# Patient Record
Sex: MALE | Race: WHITE | NOT HISPANIC OR LATINO | Employment: FULL TIME | URBAN - METROPOLITAN AREA
[De-identification: names, ages, dates, MRNs, and addresses within clinical notes are randomized per-mention and may not be internally consistent; named-entity substitution may affect disease eponyms.]

---

## 2017-01-05 RX ORDER — DICLOFENAC POTASSIUM 50 MG/1
TABLET, FILM COATED ORAL
Qty: 60 TABLET | Refills: 0 | Status: SHIPPED | OUTPATIENT
Start: 2017-01-05 | End: 2017-01-09

## 2017-01-09 ENCOUNTER — OFFICE VISIT (OUTPATIENT)
Dept: FAMILY MEDICINE CLINIC | Facility: CLINIC | Age: 42
End: 2017-01-09

## 2017-01-09 VITALS
WEIGHT: 242 LBS | HEIGHT: 73 IN | RESPIRATION RATE: 15 BRPM | TEMPERATURE: 98.3 F | HEART RATE: 83 BPM | SYSTOLIC BLOOD PRESSURE: 104 MMHG | BODY MASS INDEX: 32.07 KG/M2 | DIASTOLIC BLOOD PRESSURE: 70 MMHG

## 2017-01-09 DIAGNOSIS — R20.0 NUMBNESS IN FEET: ICD-10-CM

## 2017-01-09 DIAGNOSIS — G60.9 IDIOPATHIC PERIPHERAL NEUROPATHY: ICD-10-CM

## 2017-01-09 DIAGNOSIS — E78.5 HYPERLIPIDEMIA, UNSPECIFIED HYPERLIPIDEMIA TYPE: ICD-10-CM

## 2017-01-09 DIAGNOSIS — I10 ESSENTIAL HYPERTENSION: Primary | ICD-10-CM

## 2017-01-09 DIAGNOSIS — M54.30 SCIATICA, UNSPECIFIED LATERALITY: ICD-10-CM

## 2017-01-09 PROCEDURE — 99214 OFFICE O/P EST MOD 30 MIN: CPT | Performed by: INTERNAL MEDICINE

## 2017-01-09 NOTE — MR AVS SNAPSHOT
Anthony Landers   1/9/2017 11:40 AM   Office Visit    Dept Phone:  [phone]   Encounter #:  [id number]    Provider:  Beto Snowden MD   Department:  [facility]                Your Full Care Plan              Today's Medication Changes          These changes are accurate as of: 1/9/17 11:57 AM.  If you have any questions, ask your nurse or doctor.               New Medication(s)Ordered:     diclofenac 50 MG EC tablet   Commonly known as:  VOLTAREN   Take 1 tablet by mouth 2 (Two) Times a Day As Needed (pain, neuropathy).   Started by:  Beto Snowden MD         Stop taking medication(s)listed here:     diclofenac 50 MG tablet   Commonly known as:  CATAFLAM   Stopped by:  Beto Snowden MD                Where to Get Your Medications      These medications were sent to [facility]/pharmacy #[id number] [place], [place] - [address] IN [facility] [phone] [facility] [phone]   [address], [place] [address]    Hours:  24-hours Phone:  [phone]     diclofenac 50 MG EC tablet                  Your Updated Medication List          This list is accurate as of: 1/9/17 11:57 AM.  Always use your most recent med list.                allopurinol 100 MG tablet   Commonly known as:  ZYLOPRIM   Take 1 tablet by mouth daily.       atorvastatin 10 MG tablet   Commonly known as:  LIPITOR   TAKE 1 TABLET BY MOUTH EVERY DAY       cetirizine 10 MG tablet   Commonly known as:  zyrTEC   Take 1 tablet by mouth daily.       diclofenac 50 MG EC tablet   Commonly known as:  VOLTAREN   Take 1 tablet by mouth 2 (Two) Times a Day As Needed (pain, neuropathy).       gabapentin 300 MG capsule   Commonly known as:  NEURONTIN   Take 1 capsule by mouth daily.       hyoscyamine 0.375 MG 12 hr tablet   Commonly known as:  LEVBID   TAKE 1 TABLET BY MOUTH EVERY 12 (TWELVE) HOURS AS NEEDED FOR CRAMPING.       omeprazole 40 MG capsule      Commonly known as:  priLOSEC   TAKE 1 BY MOUTH DAILY 30 MINUTES PRIOR TO EATING       valsartan 80 MG tablet   Commonly known as:  DIOVAN   TAKE 1 TABLET BY MOUTH EVERY DAY               You Were Diagnosed With        Codes Comments    Essential hypertension    -  Primary ICD-10-CM: I10  ICD-9-CM: 401.9     Hyperlipidemia, unspecified hyperlipidemia type     ICD-10-CM: E78.5  ICD-9-CM: 272.4     Numbness in feet     ICD-10-CM: R20.0  ICD-9-CM: 782.0     Idiopathic peripheral neuropathy     ICD-10-CM: G60.9  ICD-9-CM: 356.9     Sciatica, unspecified laterality     ICD-10-CM: M54.30  ICD-9-CM: 724.3       Instructions     None    Patient Instructions History      Upcoming Appointments     Visit Type Date Time Department    OFFICE VISIT 2017 11:40 AM [employer]    [facility] 2017 12:00 PM [facility] Signup     [facility] allows you to send messages to your doctor, view your test results, renew your prescriptions, schedule appointments, and more. To sign up, go to [employer] and click on the Sign Up Now link in the New User? box. Enter your [employer] Activation Code exactly as it appears below along with the last four digits of your Social Security Number and your Date of Birth () to complete the sign-up process. If you do not sign up before the expiration date, you must request a new code.    [employer] Activation Code: [email or web address]  Expires: 2017 11:56 AM    If you have questions, you can email [email or web address] or call [phone] to talk to our [employer] staff. Remember, [employer] is NOT to be used for urgent needs. For medical emergencies, dial 911.               Other Info from Your Visit           Your Appointments     2017 12:00 PM EST   [facility] with [facility] [employer]   [facility] (--)    [address] [facility] [address] [phone]              Allergies     Propranolol       "  Reason for Visit     Follow-up essential htn    Med Refill diclofenac, lipitor      Vital Signs     Blood Pressure Pulse Temperature Respirations Height Weight    104/70 83 98.3 °F (36.8 °C) 15 73\" (185.4 cm) 242 lb (110 kg)    Body Mass Index Smoking Status                31.93 kg/m2 Never Smoker          Problems and Diagnoses Noted     High blood pressure    High cholesterol or triglycerides    Numbness in feet    Problem with function of peripheral nerve    Sciatica        "

## 2017-01-09 NOTE — PROGRESS NOTES
Subjective  chief complaint is follow-up for hypertension but also needs medication refills  Anthony Landers is a 41 y.o. male.     History of Present Illness   Anthony is here today for follow-up on his hypertension.  He does take valsartan 80 mg daily which seems to control his pressure fairly well.  He is not currently having any headaches, dizziness, chest pain, or shortness of breath.  He has had no recent gout attacks and continues to take some allopurinol 100 mg daily.  After his last visit we did advise him to stop taking the Lipitor due to an elevated CPK.  He has continued to take this.  He does have a tentative diagnosis of peripheral neuropathy.  Gabapentin and diclofenac of helped this.  He does report that his right foot is getting number.  Is primarily confined to the lateral 3 toes and extends to the heel.  However both feet feel numb at times.  He does have some back pain with occasional sciatica type symptoms.  The following portions of the patient's history were reviewed and updated as appropriate: allergies, current medications, past medical history and problem list.    Review of Systems   Constitutional: Negative for activity change and appetite change.   Respiratory: Negative for chest tightness and shortness of breath.    Cardiovascular: Negative for chest pain and leg swelling.   Musculoskeletal: Positive for back pain.   Neurological: Positive for numbness. Negative for weakness.       Objective   Physical Exam   Constitutional: He appears well-developed and well-nourished.   Neck: Carotid bruit is not present. No thyromegaly present.   Cardiovascular: Normal rate, regular rhythm, normal heart sounds and intact distal pulses.  Exam reveals no gallop and no friction rub.    No murmur heard.  Pulmonary/Chest: Effort normal and breath sounds normal. No respiratory distress. He has no wheezes. He has no rales.   Abdominal: Soft. Bowel sounds are normal. He exhibits no distension. There is no  tenderness. There is no rebound and no guarding.   Musculoskeletal: He exhibits no edema.   Nursing note and vitals reviewed.      Assessment/Plan   Anthony was seen today for follow-up and med refill.    Diagnoses and all orders for this visit:    Essential hypertension  -     Comprehensive Metabolic Panel; Future  -     CBC & Differential; Future    Hyperlipidemia, unspecified hyperlipidemia type  -     Lipid Panel; Future  -     CK; Future    Numbness in feet  -     Folate; Future  -     Methylmalonic Acid, Serum; Future  -     Vitamin B12; Future  -     MRI Lumbar Spine Without Contrast; Future  -     EMG & Nerve Conduction Test; Future    Idiopathic peripheral neuropathy  -     Folate; Future  -     Methylmalonic Acid, Serum; Future  -     Vitamin B12; Future  -     MRI Lumbar Spine Without Contrast; Future  -     EMG & Nerve Conduction Test; Future    Sciatica, unspecified laterality  -     MRI Lumbar Spine Without Contrast; Future    Other orders  -     diclofenac (VOLTAREN) 50 MG EC tablet; Take 1 tablet by mouth 2 (Two) Times a Day As Needed (pain, neuropathy).      I am is here today for follow-up.  His blood pressure seems to be doing well.  He has inadvertently continued to take the Lipitor for his cholesterol we are going to recheck his CPK level.  We may end up telling him to stop that.  For his numbness in his feet I am going to check some laboratory for rule out metabolic causes of neuropathy but I am also going to get a EMG nerve conduction study and MRI scan of his spine.

## 2017-01-11 RX ORDER — OMEPRAZOLE 40 MG/1
CAPSULE, DELAYED RELEASE ORAL
Qty: 30 CAPSULE | Refills: 1 | OUTPATIENT
Start: 2017-01-11

## 2017-01-13 ENCOUNTER — RESULTS ENCOUNTER (OUTPATIENT)
Dept: FAMILY MEDICINE CLINIC | Facility: CLINIC | Age: 42
End: 2017-01-13

## 2017-01-13 DIAGNOSIS — E78.5 HYPERLIPIDEMIA, UNSPECIFIED HYPERLIPIDEMIA TYPE: ICD-10-CM

## 2017-01-13 DIAGNOSIS — I10 ESSENTIAL HYPERTENSION: ICD-10-CM

## 2017-01-13 DIAGNOSIS — R20.0 NUMBNESS IN FEET: ICD-10-CM

## 2017-01-13 DIAGNOSIS — G60.9 IDIOPATHIC PERIPHERAL NEUROPATHY: ICD-10-CM

## 2017-01-18 ENCOUNTER — TELEPHONE (OUTPATIENT)
Dept: FAMILY MEDICINE CLINIC | Facility: CLINIC | Age: 42
End: 2017-01-18

## 2017-01-18 LAB
ALBUMIN SERPL-MCNC: 4.3 G/DL (ref 3.5–5.2)
ALBUMIN/GLOB SERPL: 1.9 G/DL
ALP SERPL-CCNC: 80 U/L (ref 39–117)
ALT SERPL-CCNC: 57 U/L (ref 1–41)
AST SERPL-CCNC: 36 U/L (ref 1–40)
BASOPHILS # BLD AUTO: 0.03 10*3/MM3 (ref 0–0.2)
BASOPHILS NFR BLD AUTO: 0.4 % (ref 0–1.5)
BILIRUB SERPL-MCNC: 0.3 MG/DL (ref 0.1–1.2)
BUN SERPL-MCNC: 18 MG/DL (ref 6–20)
BUN/CREAT SERPL: 20.5 (ref 7–25)
CALCIUM SERPL-MCNC: 9.3 MG/DL (ref 8.6–10.5)
CHLORIDE SERPL-SCNC: 102 MMOL/L (ref 98–107)
CHOLEST SERPL-MCNC: 194 MG/DL (ref 0–200)
CK SERPL-CCNC: 247 U/L (ref 20–200)
CO2 SERPL-SCNC: 27 MMOL/L (ref 22–29)
CREAT SERPL-MCNC: 0.88 MG/DL (ref 0.76–1.27)
EOSINOPHIL # BLD AUTO: 0.27 10*3/MM3 (ref 0–0.7)
EOSINOPHIL NFR BLD AUTO: 3.9 % (ref 0.3–6.2)
ERYTHROCYTE [DISTWIDTH] IN BLOOD BY AUTOMATED COUNT: 14.3 % (ref 11.5–14.5)
FOLATE SERPL-MCNC: >20 NG/ML (ref 4.78–24.2)
GLOBULIN SER CALC-MCNC: 2.3 GM/DL
GLUCOSE SERPL-MCNC: 94 MG/DL (ref 65–99)
HCT VFR BLD AUTO: 49.6 % (ref 40.4–52.2)
HDLC SERPL-MCNC: 36 MG/DL (ref 40–60)
HGB BLD-MCNC: 15.3 G/DL (ref 13.7–17.6)
IMM GRANULOCYTES # BLD: 0 10*3/MM3 (ref 0–0.03)
IMM GRANULOCYTES NFR BLD: 0 % (ref 0–0.5)
INTERPRETATION: NORMAL
LDLC SERPL CALC-MCNC: 130 MG/DL (ref 0–100)
LYMPHOCYTES # BLD AUTO: 1.76 10*3/MM3 (ref 0.9–4.8)
LYMPHOCYTES NFR BLD AUTO: 25.4 % (ref 19.6–45.3)
Lab: NORMAL
MCH RBC QN AUTO: 27.4 PG (ref 27–32.7)
MCHC RBC AUTO-ENTMCNC: 30.8 G/DL (ref 32.6–36.4)
MCV RBC AUTO: 88.9 FL (ref 79.8–96.2)
METHYLMALONATE SERPL-SCNC: 112 NMOL/L (ref 0–378)
MONOCYTES # BLD AUTO: 0.53 10*3/MM3 (ref 0.2–1.2)
MONOCYTES NFR BLD AUTO: 7.7 % (ref 5–12)
NEUTROPHILS # BLD AUTO: 4.33 10*3/MM3 (ref 1.9–8.1)
NEUTROPHILS NFR BLD AUTO: 62.6 % (ref 42.7–76)
PLATELET # BLD AUTO: 267 10*3/MM3 (ref 140–500)
POTASSIUM SERPL-SCNC: 4.2 MMOL/L (ref 3.5–5.2)
PROT SERPL-MCNC: 6.6 G/DL (ref 6–8.5)
RBC # BLD AUTO: 5.58 10*6/MM3 (ref 4.6–6)
SODIUM SERPL-SCNC: 143 MMOL/L (ref 136–145)
TRIGL SERPL-MCNC: 139 MG/DL (ref 0–150)
VIT B12 SERPL-MCNC: 296 PG/ML (ref 211–946)
VLDLC SERPL CALC-MCNC: 27.8 MG/DL (ref 5–40)
WBC # BLD AUTO: 6.92 10*3/MM3 (ref 4.5–10.7)

## 2017-01-18 NOTE — TELEPHONE ENCOUNTER
A message was left regarding test results.  I did advise watching diet more closely for cholesterol.

## 2017-01-19 ENCOUNTER — APPOINTMENT (OUTPATIENT)
Dept: INFUSION THERAPY | Facility: HOSPITAL | Age: 42
End: 2017-01-19
Attending: INTERNAL MEDICINE

## 2017-01-19 ENCOUNTER — APPOINTMENT (OUTPATIENT)
Dept: MRI IMAGING | Facility: HOSPITAL | Age: 42
End: 2017-01-19
Attending: INTERNAL MEDICINE

## 2017-01-20 ENCOUNTER — HOSPITAL ENCOUNTER (OUTPATIENT)
Dept: MRI IMAGING | Facility: HOSPITAL | Age: 42
Discharge: HOME | End: 2017-01-20
Attending: INTERNAL MEDICINE

## 2017-01-20 ENCOUNTER — HOSPITAL ENCOUNTER (OUTPATIENT)
Dept: INFUSION THERAPY | Facility: HOSPITAL | Age: 42
Discharge: HOME | End: 2017-01-20
Attending: INTERNAL MEDICINE | Admitting: PHYSICAL MEDICINE & REHABILITATION

## 2017-01-20 DIAGNOSIS — G60.9 IDIOPATHIC PERIPHERAL NEUROPATHY: ICD-10-CM

## 2017-01-20 DIAGNOSIS — M54.30 SCIATICA, UNSPECIFIED LATERALITY: ICD-10-CM

## 2017-01-20 DIAGNOSIS — R20.0 NUMBNESS IN FEET: ICD-10-CM

## 2017-01-20 PROCEDURE — 95886 MUSC TEST DONE W/N TEST COMP: CPT

## 2017-01-20 PROCEDURE — 72148 MRI LUMBAR SPINE W/O DYE: CPT

## 2017-01-20 PROCEDURE — 95909 NRV CNDJ TST 5-6 STUDIES: CPT

## 2017-01-23 ENCOUNTER — TELEPHONE (OUTPATIENT)
Dept: FAMILY MEDICINE CLINIC | Facility: CLINIC | Age: 42
End: 2017-01-23

## 2017-01-23 NOTE — TELEPHONE ENCOUNTER
A message was left regarding the MRI scan results.  I do not see a whole lot here to explain the symptoms in his legs.

## 2017-02-13 RX ORDER — ALLOPURINOL 100 MG/1
TABLET ORAL
Qty: 30 TABLET | Refills: 2 | Status: SHIPPED | OUTPATIENT
Start: 2017-02-13 | End: 2017-02-16 | Stop reason: SDUPTHER

## 2017-02-16 RX ORDER — ALLOPURINOL 100 MG/1
TABLET ORAL
Qty: 30 TABLET | Refills: 5 | Status: SHIPPED | OUTPATIENT
Start: 2017-02-16 | End: 2017-08-11 | Stop reason: SDUPTHER

## 2017-03-08 ENCOUNTER — TELEPHONE (OUTPATIENT)
Dept: FAMILY MEDICINE CLINIC | Facility: CLINIC | Age: 42
End: 2017-03-08

## 2017-03-08 NOTE — TELEPHONE ENCOUNTER
"----- Message from Patricia Stein sent at 3/7/2017 12:51 PM EST -----  VIC SAID HE GOT YOUR CALL WITH THE MRI RESULTS, SAID HE HASN'T HEARD ANYTHING ABOUT THE EMG / NCT. I SEE IN THE MEDIA TAB WHERE SOMEONE FROM ANOTHER OFFICE SCANNED IN AN \"EMG\". DOES THAT INCLUDE THE NCT? IF NOT, LET ME KNOW AND I WILL CONTACT [facility] TO GET THE REPORT // TYRON   6 was left that the EMG was normal.  The nerve conduction study shows some mild delay in the cervical nerve.  This may be a normal variant or could indicate early neuropathy.  In either case I do not think there is much to do to treat it at this point in time.  "

## 2017-03-22 RX ORDER — GABAPENTIN 300 MG/1
CAPSULE ORAL
Qty: 30 CAPSULE | Refills: 5 | Status: SHIPPED | OUTPATIENT
Start: 2017-03-22 | End: 2017-09-23 | Stop reason: SDUPTHER

## 2017-05-12 RX ORDER — HYOSCYAMINE SULFATE EXTENDED-RELEASE 0.38 MG/1
TABLET ORAL
Qty: 60 TABLET | Refills: 3 | Status: SHIPPED | OUTPATIENT
Start: 2017-05-12 | End: 2017-09-08 | Stop reason: SDUPTHER

## 2017-06-30 RX ORDER — OMEPRAZOLE 40 MG/1
CAPSULE, DELAYED RELEASE ORAL
Qty: 30 CAPSULE | Refills: 1 | Status: SHIPPED | OUTPATIENT
Start: 2017-06-30 | End: 2017-08-11 | Stop reason: SDUPTHER

## 2017-07-27 RX ORDER — VALSARTAN 80 MG/1
TABLET ORAL
Qty: 30 TABLET | Refills: 0 | Status: SHIPPED | OUTPATIENT
Start: 2017-07-27 | End: 2017-08-11 | Stop reason: SDUPTHER

## 2017-08-11 ENCOUNTER — OFFICE VISIT (OUTPATIENT)
Dept: FAMILY MEDICINE CLINIC | Facility: CLINIC | Age: 42
End: 2017-08-11

## 2017-08-11 VITALS
HEIGHT: 73 IN | HEART RATE: 72 BPM | BODY MASS INDEX: 32.07 KG/M2 | WEIGHT: 242 LBS | OXYGEN SATURATION: 99 % | SYSTOLIC BLOOD PRESSURE: 104 MMHG | DIASTOLIC BLOOD PRESSURE: 64 MMHG

## 2017-08-11 DIAGNOSIS — I10 ESSENTIAL HYPERTENSION: ICD-10-CM

## 2017-08-11 DIAGNOSIS — E78.5 HYPERLIPIDEMIA, UNSPECIFIED HYPERLIPIDEMIA TYPE: ICD-10-CM

## 2017-08-11 DIAGNOSIS — K21.9 GASTROESOPHAGEAL REFLUX DISEASE WITHOUT ESOPHAGITIS: Primary | ICD-10-CM

## 2017-08-11 PROCEDURE — 99213 OFFICE O/P EST LOW 20 MIN: CPT | Performed by: INTERNAL MEDICINE

## 2017-08-11 RX ORDER — ALLOPURINOL 100 MG/1
100 TABLET ORAL DAILY
Qty: 30 TABLET | Refills: 5 | Status: SHIPPED | OUTPATIENT
Start: 2017-08-11 | End: 2018-03-28 | Stop reason: SDUPTHER

## 2017-08-11 RX ORDER — OMEPRAZOLE 40 MG/1
40 CAPSULE, DELAYED RELEASE ORAL DAILY
Qty: 30 CAPSULE | Refills: 5 | Status: SHIPPED | OUTPATIENT
Start: 2017-08-11 | End: 2018-03-28 | Stop reason: SDUPTHER

## 2017-08-11 RX ORDER — VALSARTAN 80 MG/1
80 TABLET ORAL DAILY
Qty: 30 TABLET | Refills: 5 | Status: SHIPPED | OUTPATIENT
Start: 2017-08-11 | End: 2018-02-24 | Stop reason: SDUPTHER

## 2017-08-11 NOTE — PROGRESS NOTES
Subjective chief complaint is medication refill for omeprazole  Anthony Landers is a 42 y.o. male.     History of Present Illness   Anthony is here today for follow-up.  He does have gastroesophageal reflux disease for which he takes omeprazole 40 mg daily.  Previously this was prescribed by his gastroenterologist.  He has very little indigestion or heartburn while taking it.  He does have a history of hypertension.  He is on valsartan 80 mg daily which controls things fairly well.  He has had some mild hyperlipidemia but is currently on no medication.  He was engaged but his fiancée broke off the engagement in March.  He is really not too unhappy about this.  He has had some intermittent erectile difficulty but also does not seem to concerned about that.  The following portions of the patient's history were reviewed and updated as appropriate: allergies, current medications, past medical history and problem list.    Review of Systems   Respiratory: Negative for chest tightness and shortness of breath.    Cardiovascular: Negative for chest pain and leg swelling.   Neurological: Positive for light-headedness. Negative for dizziness and headaches.       Objective   Physical Exam   Constitutional: He appears well-developed and well-nourished.   Neck: Carotid bruit is not present. No thyromegaly present.   Cardiovascular: Normal rate, regular rhythm, normal heart sounds and intact distal pulses.  Exam reveals no gallop and no friction rub.    No murmur heard.  Pulmonary/Chest: Effort normal and breath sounds normal. No respiratory distress. He has no wheezes. He has no rales.   Abdominal: Soft. Bowel sounds are normal. He exhibits no distension. There is no tenderness. There is no rebound and no guarding.   Musculoskeletal: He exhibits no edema.   Nursing note and vitals reviewed.      Assessment/Plan   Anthony was seen today for med refill.    Diagnoses and all orders for this visit:    Gastroesophageal reflux disease  without esophagitis    Essential hypertension  -     Comprehensive Metabolic Panel; Future  -     Lipid Panel; Future    Hyperlipidemia, unspecified hyperlipidemia type  -     Comprehensive Metabolic Panel; Future  -     Lipid Panel; Future    Other orders  -     omeprazole (priLOSEC) 40 MG capsule; Take 1 capsule by mouth Daily.  -     valsartan (DIOVAN) 80 MG tablet; Take 1 tablet by mouth Daily.  -     allopurinol (ZYLOPRIM) 100 MG tablet; Take 1 tablet by mouth Daily.      Anthoyn is here today for follow-up.  We did renew his omeprazole.  We also renewed his valsartan and allopurinol.  We are due to check some lab work for his cholesterol and he will do this on a fasting basis.

## 2017-08-14 ENCOUNTER — RESULTS ENCOUNTER (OUTPATIENT)
Dept: FAMILY MEDICINE CLINIC | Facility: CLINIC | Age: 42
End: 2017-08-14

## 2017-08-14 DIAGNOSIS — I10 ESSENTIAL HYPERTENSION: ICD-10-CM

## 2017-08-14 DIAGNOSIS — E78.5 HYPERLIPIDEMIA, UNSPECIFIED HYPERLIPIDEMIA TYPE: ICD-10-CM

## 2017-08-15 LAB
ALBUMIN SERPL-MCNC: 4.4 G/DL (ref 3.5–5.2)
ALBUMIN/GLOB SERPL: 1.8 G/DL
ALP SERPL-CCNC: 76 U/L (ref 39–117)
ALT SERPL-CCNC: 51 U/L (ref 1–41)
AST SERPL-CCNC: 36 U/L (ref 1–40)
BILIRUB SERPL-MCNC: 0.2 MG/DL (ref 0.1–1.2)
BUN SERPL-MCNC: 15 MG/DL (ref 6–20)
BUN/CREAT SERPL: 16.1 (ref 7–25)
CALCIUM SERPL-MCNC: 9.6 MG/DL (ref 8.6–10.5)
CHLORIDE SERPL-SCNC: 102 MMOL/L (ref 98–107)
CHOLEST SERPL-MCNC: 223 MG/DL (ref 0–200)
CO2 SERPL-SCNC: 28.3 MMOL/L (ref 22–29)
CREAT SERPL-MCNC: 0.93 MG/DL (ref 0.76–1.27)
GLOBULIN SER CALC-MCNC: 2.5 GM/DL
GLUCOSE SERPL-MCNC: 95 MG/DL (ref 65–99)
HDLC SERPL-MCNC: 29 MG/DL (ref 40–60)
INTERPRETATION: NORMAL
LDLC SERPL CALC-MCNC: 143 MG/DL (ref 0–100)
POTASSIUM SERPL-SCNC: 4.4 MMOL/L (ref 3.5–5.2)
PROT SERPL-MCNC: 6.9 G/DL (ref 6–8.5)
SODIUM SERPL-SCNC: 143 MMOL/L (ref 136–145)
TRIGL SERPL-MCNC: 255 MG/DL (ref 0–150)
VLDLC SERPL CALC-MCNC: 51 MG/DL (ref 5–40)

## 2017-08-24 RX ORDER — VALSARTAN 80 MG/1
TABLET ORAL
Qty: 30 TABLET | Refills: 5 | Status: SHIPPED | OUTPATIENT
Start: 2017-08-24 | End: 2018-01-12

## 2017-08-24 RX ORDER — ALLOPURINOL 100 MG/1
TABLET ORAL
Qty: 30 TABLET | Refills: 5 | Status: SHIPPED | OUTPATIENT
Start: 2017-08-24 | End: 2018-01-12

## 2017-08-31 RX ORDER — OMEPRAZOLE 40 MG/1
CAPSULE, DELAYED RELEASE ORAL
Qty: 30 CAPSULE | Refills: 3 | Status: SHIPPED | OUTPATIENT
Start: 2017-08-31 | End: 2018-01-12

## 2017-09-08 RX ORDER — HYOSCYAMINE SULFATE EXTENDED-RELEASE 0.38 MG/1
TABLET ORAL
Qty: 60 TABLET | Refills: 1 | Status: SHIPPED | OUTPATIENT
Start: 2017-09-08 | End: 2017-11-09 | Stop reason: SDUPTHER

## 2017-09-25 RX ORDER — GABAPENTIN 300 MG/1
CAPSULE ORAL
Qty: 30 CAPSULE | Refills: 4 | OUTPATIENT
Start: 2017-09-25 | End: 2017-09-25 | Stop reason: SDUPTHER

## 2017-09-25 NOTE — TELEPHONE ENCOUNTER
Last ov 8/11/17  Last filled 3/2/2017  Will emerita myers ok to refill ?     Ok to phone in gabapentin.

## 2017-09-26 RX ORDER — GABAPENTIN 300 MG/1
CAPSULE ORAL
Qty: 30 CAPSULE | Refills: 4 | OUTPATIENT
Start: 2017-09-26 | End: 2018-03-28 | Stop reason: SDUPTHER

## 2017-11-09 RX ORDER — HYOSCYAMINE SULFATE EXTENDED-RELEASE 0.38 MG/1
0.38 TABLET ORAL EVERY 12 HOURS PRN
Qty: 60 TABLET | Refills: 0 | Status: SHIPPED | OUTPATIENT
Start: 2017-11-09 | End: 2017-12-12 | Stop reason: SDUPTHER

## 2017-12-08 RX ORDER — HYOSCYAMINE SULFATE EXTENDED-RELEASE 0.38 MG/1
TABLET ORAL
Qty: 60 TABLET | Refills: 0 | OUTPATIENT
Start: 2017-12-08

## 2017-12-12 RX ORDER — HYOSCYAMINE SULFATE EXTENDED-RELEASE 0.38 MG/1
0.38 TABLET ORAL EVERY 12 HOURS PRN
Qty: 60 TABLET | Refills: 0 | Status: SHIPPED | OUTPATIENT
Start: 2017-12-12 | End: 2018-01-11 | Stop reason: SDUPTHER

## 2018-01-11 RX ORDER — HYOSCYAMINE SULFATE EXTENDED-RELEASE 0.38 MG/1
TABLET ORAL
Qty: 60 TABLET | Refills: 0 | Status: SHIPPED | OUTPATIENT
Start: 2018-01-11 | End: 2018-01-12 | Stop reason: SDUPTHER

## 2018-01-12 ENCOUNTER — OFFICE VISIT (OUTPATIENT)
Dept: GASTROENTEROLOGY | Facility: CLINIC | Age: 43
End: 2018-01-12

## 2018-01-12 VITALS
DIASTOLIC BLOOD PRESSURE: 68 MMHG | HEIGHT: 73 IN | WEIGHT: 238 LBS | SYSTOLIC BLOOD PRESSURE: 110 MMHG | BODY MASS INDEX: 31.54 KG/M2

## 2018-01-12 DIAGNOSIS — K58.0 IRRITABLE BOWEL SYNDROME WITH DIARRHEA: ICD-10-CM

## 2018-01-12 DIAGNOSIS — K21.9 GASTROESOPHAGEAL REFLUX DISEASE WITHOUT ESOPHAGITIS: Primary | ICD-10-CM

## 2018-01-12 PROCEDURE — 99213 OFFICE O/P EST LOW 20 MIN: CPT | Performed by: NURSE PRACTITIONER

## 2018-01-12 RX ORDER — HYOSCYAMINE SULFATE EXTENDED-RELEASE 0.38 MG/1
0.38 TABLET ORAL EVERY 12 HOURS PRN
Qty: 60 TABLET | Refills: 11 | Status: SHIPPED | OUTPATIENT
Start: 2018-01-12 | End: 2019-09-05

## 2018-01-12 NOTE — PROGRESS NOTES
Chief Complaint   Patient presents with   • Follow-up     yearly/med refills     HPI    42-year-old male patient of Dr. Robles's followed for history of GERD and irritable bowel syndrome.  Previous workup has included an EGD and colonoscopy in May 2014 area post scopes were unremarkable with negative pathology.  He is here today for yearly follow-up and requests for medication refills.    GERD symptoms are well-controlled with daily PPI.  He is on omeprazole 40 mg.  He has no dysphagia or odynophagia, he is not having breakthrough symptoms, he does not awaken at night with symptoms.  His weight has been stable.    IBS is well controlled with Levsin taken twice daily.  He will have intermittent loose stools depending upon dietary intake but bowel elimination is typically once daily with the form stool.  He denies any bright red blood or melena.  He has intermittent abdominal cramping in the stools get loose but again symptoms are well-controlled with Levbid    Past Medical History:   Diagnosis Date   • Asthma    • GERD (gastroesophageal reflux disease)    • Hypertension    • Irritable bowel syndrome      Past Surgical History:   Procedure Laterality Date   • COLONOSCOPY  05/21/2014    ih   • UPPER GASTROINTESTINAL ENDOSCOPY  05/21/2014    nml       Current Outpatient Prescriptions   Medication Sig Dispense Refill   • allopurinol (ZYLOPRIM) 100 MG tablet Take 1 tablet by mouth Daily. 30 tablet 5   • diclofenac (VOLTAREN) 50 MG EC tablet TAKE 1 TABLET BY MOUTH TWICE A DAY AS NEEDED 60 tablet 1   • gabapentin (NEURONTIN) 300 MG capsule TAKE ONE CAPSULE BY MOUTH EVERY DAY 30 capsule 4   • hyoscyamine (LEVBID) 0.375 MG 12 hr tablet Take 1 tablet by mouth Every 12 (Twelve) Hours As Needed for Cramping. 60 tablet 11   • omeprazole (priLOSEC) 40 MG capsule Take 1 capsule by mouth Daily. 30 capsule 5   • valsartan (DIOVAN) 80 MG tablet Take 1 tablet by mouth Daily. 30 tablet 5     No current facility-administered medications  "for this visit.        PRN Meds:.    Allergies   Allergen Reactions   • Propranolol        Social History     Social History   • Marital status: Single     Spouse name: N/A   • Number of children: N/A   • Years of education: N/A     Occupational History   • Not on file.     Social History Main Topics   • Smoking status: Never Smoker   • Smokeless tobacco: Never Used   • Alcohol use No   • Drug use: No   • Sexual activity: Defer     Other Topics Concern   • Not on file     Social History Narrative       History reviewed. No pertinent family history.    Review of Systems   Constitutional: Negative for activity change, appetite change and unexpected weight change.   HENT: Negative for trouble swallowing.    Respiratory: Negative for choking.    Gastrointestinal: Negative for abdominal distention, abdominal pain, blood in stool, constipation, diarrhea, nausea and vomiting.   Allergic/Immunologic: Negative for immunocompromised state.       Vitals:    01/12/18 1037   BP: 110/68     /68  Ht 185.4 cm (73\")  Wt 108 kg (238 lb)  BMI 31.4 kg/m2  Physical Exam   Constitutional: He is oriented to person, place, and time. He appears well-developed and well-nourished. No distress.   HENT:   Head: Normocephalic and atraumatic.   Eyes: No scleral icterus.   Cardiovascular: Normal rate and regular rhythm.    Pulmonary/Chest: Effort normal and breath sounds normal.   Abdominal: Soft. Bowel sounds are normal. He exhibits no distension. There is no tenderness.   Neurological: He is alert and oriented to person, place, and time.   Skin: Skin is warm and dry.   Psychiatric: He has a normal mood and affect.   Vitals reviewed.      ASSESSMENT AND PLAN    Anthony was seen today for follow-up.    Diagnoses and all orders for this visit:    Gastroesophageal reflux disease without esophagitis  Comments:  EGD 5/2014 normal, neg path.  Well controlled on current PPI    Irritable bowel syndrome with diarrhea  Comments:  C-scope 5/2014, " NBIH.  Diarrhea controlled with levbid.      Other orders  -     hyoscyamine (LEVBID) 0.375 MG 12 hr tablet; Take 1 tablet by mouth Every 12 (Twelve) Hours As Needed for Cramping.    Patient will continue current medications of daily PPI and Levbid twice daily.  Previous endoscopies in May 2014 with regard to EGD and colonoscopy were both normal.  He will return in one year for routine follow-up, sooner for any changes from today's assessment         MAZIN Chandler   [facility]  [address] [place] [address]  Office: [phone]

## 2018-01-26 RX ORDER — OMEPRAZOLE 40 MG/1
CAPSULE, DELAYED RELEASE ORAL
Qty: 30 CAPSULE | Refills: 0 | Status: SHIPPED | OUTPATIENT
Start: 2018-01-26 | End: 2018-03-28

## 2018-02-26 RX ORDER — VALSARTAN 80 MG/1
TABLET ORAL
Qty: 30 TABLET | Refills: 0 | Status: SHIPPED | OUTPATIENT
Start: 2018-02-26 | End: 2018-03-27 | Stop reason: SDUPTHER

## 2018-03-25 RX ORDER — ALLOPURINOL 100 MG/1
100 TABLET ORAL DAILY
Qty: 30 TABLET | Refills: 0 | OUTPATIENT
Start: 2018-03-25

## 2018-03-25 RX ORDER — VALSARTAN 80 MG/1
TABLET ORAL
Qty: 30 TABLET | Refills: 0 | Status: CANCELLED | OUTPATIENT
Start: 2018-03-25

## 2018-03-27 RX ORDER — VALSARTAN 80 MG/1
TABLET ORAL
Qty: 30 TABLET | Refills: 0 | Status: SHIPPED | OUTPATIENT
Start: 2018-03-27 | End: 2018-03-28 | Stop reason: SDUPTHER

## 2018-03-28 ENCOUNTER — OFFICE VISIT (OUTPATIENT)
Dept: FAMILY MEDICINE CLINIC | Facility: CLINIC | Age: 43
End: 2018-03-28

## 2018-03-28 VITALS
SYSTOLIC BLOOD PRESSURE: 112 MMHG | HEART RATE: 83 BPM | WEIGHT: 240 LBS | DIASTOLIC BLOOD PRESSURE: 88 MMHG | OXYGEN SATURATION: 98 % | HEIGHT: 73 IN | BODY MASS INDEX: 31.81 KG/M2 | TEMPERATURE: 97.8 F

## 2018-03-28 DIAGNOSIS — I10 ESSENTIAL HYPERTENSION: Primary | ICD-10-CM

## 2018-03-28 DIAGNOSIS — G60.9 IDIOPATHIC PERIPHERAL NEUROPATHY: ICD-10-CM

## 2018-03-28 DIAGNOSIS — E78.5 HYPERLIPIDEMIA, UNSPECIFIED HYPERLIPIDEMIA TYPE: ICD-10-CM

## 2018-03-28 DIAGNOSIS — M10.00 IDIOPATHIC GOUT, UNSPECIFIED CHRONICITY, UNSPECIFIED SITE: ICD-10-CM

## 2018-03-28 PROCEDURE — 99213 OFFICE O/P EST LOW 20 MIN: CPT | Performed by: INTERNAL MEDICINE

## 2018-03-28 RX ORDER — VALSARTAN 80 MG/1
80 TABLET ORAL DAILY
Qty: 30 TABLET | Refills: 5 | Status: SHIPPED | OUTPATIENT
Start: 2018-03-28 | End: 2018-08-14 | Stop reason: ALTCHOICE

## 2018-03-28 RX ORDER — GABAPENTIN 300 MG/1
CAPSULE ORAL
Qty: 30 CAPSULE | Status: CANCELLED | OUTPATIENT
Start: 2018-03-28

## 2018-03-28 RX ORDER — GABAPENTIN 300 MG/1
300 CAPSULE ORAL DAILY
Qty: 30 CAPSULE | Refills: 5 | Status: SHIPPED | OUTPATIENT
Start: 2018-03-28 | End: 2018-09-28 | Stop reason: SDUPTHER

## 2018-03-28 RX ORDER — ALLOPURINOL 100 MG/1
TABLET ORAL
Qty: 30 TABLET | Refills: 5 | Status: SHIPPED | OUTPATIENT
Start: 2018-03-28 | End: 2018-03-28

## 2018-03-28 RX ORDER — ALLOPURINOL 100 MG/1
100 TABLET ORAL DAILY
Qty: 30 TABLET | Refills: 5 | Status: SHIPPED | OUTPATIENT
Start: 2018-03-28 | End: 2018-09-28 | Stop reason: SDUPTHER

## 2018-03-28 RX ORDER — OMEPRAZOLE 40 MG/1
40 CAPSULE, DELAYED RELEASE ORAL DAILY
Qty: 30 CAPSULE | Refills: 5 | Status: SHIPPED | OUTPATIENT
Start: 2018-03-28 | End: 2018-09-21 | Stop reason: SDUPTHER

## 2018-03-28 NOTE — PROGRESS NOTES
Subjective chief complaint is refill of gabapentin  Anthony Landers is a 43 y.o. male.     History of Present Illness   I am is here today for refill on his gabapentin.  He does have peripheral neuropathy.  Taking this once daily does seem to help.  He also has a history of hypertension.  He is on low-dose valsartan.  His blood pressure typically has been well-controlled with this.  He has prior history of gout.  His gout currently is well controlled with allopurinol.  He does also use some diclofenac.    The following portions of the patient's history were reviewed and updated as appropriate: allergies, current medications, past medical history and problem list.    Review of Systems   Respiratory: Negative for chest tightness and shortness of breath.    Cardiovascular: Negative for chest pain and leg swelling.   Neurological: Negative for dizziness, light-headedness and headaches.       Objective   Physical Exam   Constitutional: He appears well-developed and well-nourished.   Cardiovascular: Normal rate, regular rhythm and normal heart sounds.  Exam reveals no gallop and no friction rub.    No murmur heard.  Blood pressure is 110/76 to my exam   Pulmonary/Chest: Effort normal and breath sounds normal. No respiratory distress. He has no wheezes. He has no rales.   Abdominal: Soft. Bowel sounds are normal. He exhibits no distension and no mass. There is no tenderness. There is no guarding.   Musculoskeletal: He exhibits no edema.         Assessment/Plan   Anthony was seen today for med refill.    Diagnoses and all orders for this visit:    Essential hypertension  -     Comprehensive Metabolic Panel; Future  -     CBC & Differential; Future    Idiopathic peripheral neuropathy    Hyperlipidemia, unspecified hyperlipidemia type  -     Lipid Panel; Future    Idiopathic gout, unspecified chronicity, unspecified site  -     Uric Acid; Future    Other orders  -     gabapentin (NEURONTIN) 300 MG capsule; Take 1 capsule by mouth  Daily.  -     valsartan (DIOVAN) 80 MG tablet; Take 1 tablet by mouth Daily.  -     omeprazole (priLOSEC) 40 MG capsule; Take 1 capsule by mouth Daily.  -     diclofenac (VOLTAREN) 50 MG EC tablet; Take 1 tablet by mouth 2 (Two) Times a Day.  -     allopurinol (ZYLOPRIM) 100 MG tablet; Take 1 tablet by mouth Daily.      Anthony is here today for follow-up.  He does have several medical problems but all are stable.  We will see him back in 6 months.

## 2018-03-29 RX ORDER — GABAPENTIN 300 MG/1
CAPSULE ORAL
Qty: 30 CAPSULE | OUTPATIENT
Start: 2018-03-29

## 2018-03-30 ENCOUNTER — RESULTS ENCOUNTER (OUTPATIENT)
Dept: FAMILY MEDICINE CLINIC | Facility: CLINIC | Age: 43
End: 2018-03-30

## 2018-03-30 DIAGNOSIS — I10 ESSENTIAL HYPERTENSION: ICD-10-CM

## 2018-03-30 DIAGNOSIS — E78.5 HYPERLIPIDEMIA, UNSPECIFIED HYPERLIPIDEMIA TYPE: ICD-10-CM

## 2018-03-30 DIAGNOSIS — M10.00 IDIOPATHIC GOUT, UNSPECIFIED CHRONICITY, UNSPECIFIED SITE: ICD-10-CM

## 2018-04-03 LAB
ALBUMIN SERPL-MCNC: 4.7 G/DL (ref 3.5–5.2)
ALBUMIN/GLOB SERPL: 2 G/DL
ALP SERPL-CCNC: 79 U/L (ref 39–117)
ALT SERPL-CCNC: 40 U/L (ref 1–41)
AST SERPL-CCNC: 33 U/L (ref 1–40)
BASOPHILS # BLD AUTO: 0.04 10*3/MM3 (ref 0–0.2)
BASOPHILS NFR BLD AUTO: 0.5 % (ref 0–1.5)
BILIRUB SERPL-MCNC: 0.4 MG/DL (ref 0.1–1.2)
BUN SERPL-MCNC: 17 MG/DL (ref 6–20)
BUN/CREAT SERPL: 20.2 (ref 7–25)
CALCIUM SERPL-MCNC: 9.8 MG/DL (ref 8.6–10.5)
CHLORIDE SERPL-SCNC: 99 MMOL/L (ref 98–107)
CHOLEST SERPL-MCNC: 237 MG/DL (ref 0–200)
CO2 SERPL-SCNC: 29.2 MMOL/L (ref 22–29)
CREAT SERPL-MCNC: 0.84 MG/DL (ref 0.76–1.27)
EOSINOPHIL # BLD AUTO: 0.32 10*3/MM3 (ref 0–0.7)
EOSINOPHIL NFR BLD AUTO: 3.9 % (ref 0.3–6.2)
ERYTHROCYTE [DISTWIDTH] IN BLOOD BY AUTOMATED COUNT: 14.3 % (ref 11.5–14.5)
GFR SERPLBLD CREATININE-BSD FMLA CKD-EPI: 120 ML/MIN/1.73
GFR SERPLBLD CREATININE-BSD FMLA CKD-EPI: 99 ML/MIN/1.73
GLOBULIN SER CALC-MCNC: 2.3 GM/DL
GLUCOSE SERPL-MCNC: 82 MG/DL (ref 65–99)
HCT VFR BLD AUTO: 50.7 % (ref 40.4–52.2)
HDLC SERPL-MCNC: 33 MG/DL (ref 40–60)
HGB BLD-MCNC: 15.9 G/DL (ref 13.7–17.6)
IMM GRANULOCYTES # BLD: 0.02 10*3/MM3 (ref 0–0.03)
IMM GRANULOCYTES NFR BLD: 0.2 % (ref 0–0.5)
INTERPRETATION: NORMAL
LDLC SERPL CALC-MCNC: 160 MG/DL (ref 0–100)
LYMPHOCYTES # BLD AUTO: 1.73 10*3/MM3 (ref 0.9–4.8)
LYMPHOCYTES NFR BLD AUTO: 20.9 % (ref 19.6–45.3)
MCH RBC QN AUTO: 27.8 PG (ref 27–32.7)
MCHC RBC AUTO-ENTMCNC: 31.4 G/DL (ref 32.6–36.4)
MCV RBC AUTO: 88.6 FL (ref 79.8–96.2)
MONOCYTES # BLD AUTO: 0.52 10*3/MM3 (ref 0.2–1.2)
MONOCYTES NFR BLD AUTO: 6.3 % (ref 5–12)
NEUTROPHILS # BLD AUTO: 5.65 10*3/MM3 (ref 1.9–8.1)
NEUTROPHILS NFR BLD AUTO: 68.2 % (ref 42.7–76)
PLATELET # BLD AUTO: 276 10*3/MM3 (ref 140–500)
POTASSIUM SERPL-SCNC: 4.6 MMOL/L (ref 3.5–5.2)
PROT SERPL-MCNC: 7 G/DL (ref 6–8.5)
RBC # BLD AUTO: 5.72 10*6/MM3 (ref 4.6–6)
SODIUM SERPL-SCNC: 142 MMOL/L (ref 136–145)
TRIGL SERPL-MCNC: 219 MG/DL (ref 0–150)
URATE SERPL-MCNC: 6.9 MG/DL (ref 3.4–7)
VLDLC SERPL CALC-MCNC: 43.8 MG/DL (ref 5–40)
WBC # BLD AUTO: 8.28 10*3/MM3 (ref 4.5–10.7)

## 2018-04-04 RX ORDER — ATORVASTATIN CALCIUM 10 MG/1
10 TABLET, FILM COATED ORAL DAILY
Qty: 30 TABLET | Refills: 5 | Status: SHIPPED | OUTPATIENT
Start: 2018-04-04 | End: 2018-09-21 | Stop reason: SDUPTHER

## 2018-08-14 RX ORDER — LOSARTAN POTASSIUM 100 MG/1
100 TABLET ORAL DAILY
Qty: 30 TABLET | Refills: 5 | Status: SHIPPED | OUTPATIENT
Start: 2018-08-14 | End: 2019-02-05 | Stop reason: SDUPTHER

## 2018-09-21 RX ORDER — ATORVASTATIN CALCIUM 10 MG/1
10 TABLET, FILM COATED ORAL DAILY
Qty: 30 TABLET | Refills: 0 | Status: SHIPPED | OUTPATIENT
Start: 2018-09-21 | End: 2018-09-28 | Stop reason: SDUPTHER

## 2018-09-21 RX ORDER — OMEPRAZOLE 40 MG/1
40 CAPSULE, DELAYED RELEASE ORAL DAILY
Qty: 30 CAPSULE | Refills: 0 | Status: SHIPPED | OUTPATIENT
Start: 2018-09-21 | End: 2018-09-28 | Stop reason: SDUPTHER

## 2018-09-26 RX ORDER — GABAPENTIN 300 MG/1
CAPSULE ORAL
Qty: 30 CAPSULE | OUTPATIENT
Start: 2018-09-26

## 2018-09-28 ENCOUNTER — OFFICE VISIT (OUTPATIENT)
Dept: FAMILY MEDICINE CLINIC | Facility: CLINIC | Age: 43
End: 2018-09-28

## 2018-09-28 VITALS
DIASTOLIC BLOOD PRESSURE: 90 MMHG | OXYGEN SATURATION: 99 % | HEIGHT: 73 IN | WEIGHT: 236 LBS | BODY MASS INDEX: 31.28 KG/M2 | TEMPERATURE: 97.8 F | SYSTOLIC BLOOD PRESSURE: 120 MMHG | RESPIRATION RATE: 16 BRPM | HEART RATE: 79 BPM

## 2018-09-28 DIAGNOSIS — I10 ESSENTIAL HYPERTENSION: Primary | ICD-10-CM

## 2018-09-28 DIAGNOSIS — M10.00 IDIOPATHIC GOUT, UNSPECIFIED CHRONICITY, UNSPECIFIED SITE: ICD-10-CM

## 2018-09-28 DIAGNOSIS — E78.5 HYPERLIPIDEMIA, UNSPECIFIED HYPERLIPIDEMIA TYPE: ICD-10-CM

## 2018-09-28 DIAGNOSIS — G60.9 IDIOPATHIC PERIPHERAL NEUROPATHY: ICD-10-CM

## 2018-09-28 LAB
ALBUMIN SERPL-MCNC: 4.3 G/DL (ref 3.5–5.2)
ALBUMIN/GLOB SERPL: 1.8 G/DL
ALP SERPL-CCNC: 82 U/L (ref 39–117)
ALT SERPL-CCNC: 40 U/L (ref 1–41)
AST SERPL-CCNC: 26 U/L (ref 1–40)
BILIRUB SERPL-MCNC: 0.5 MG/DL (ref 0.1–1.2)
BUN SERPL-MCNC: 18 MG/DL (ref 6–20)
BUN/CREAT SERPL: 18.6 (ref 7–25)
CALCIUM SERPL-MCNC: 9.2 MG/DL (ref 8.6–10.5)
CHLORIDE SERPL-SCNC: 102 MMOL/L (ref 98–107)
CHOLEST SERPL-MCNC: 163 MG/DL (ref 0–200)
CO2 SERPL-SCNC: 28.7 MMOL/L (ref 22–29)
CREAT SERPL-MCNC: 0.97 MG/DL (ref 0.76–1.27)
GLOBULIN SER CALC-MCNC: 2.4 GM/DL
GLUCOSE SERPL-MCNC: 101 MG/DL (ref 65–99)
HDLC SERPL-MCNC: 37 MG/DL (ref 40–60)
LDLC SERPL CALC-MCNC: 98 MG/DL (ref 0–100)
POTASSIUM SERPL-SCNC: 4.5 MMOL/L (ref 3.5–5.2)
PROT SERPL-MCNC: 6.7 G/DL (ref 6–8.5)
SODIUM SERPL-SCNC: 143 MMOL/L (ref 136–145)
TRIGL SERPL-MCNC: 138 MG/DL (ref 0–150)
URATE SERPL-MCNC: 6.3 MG/DL (ref 3.4–7)
VLDLC SERPL CALC-MCNC: 27.6 MG/DL (ref 5–40)

## 2018-09-28 PROCEDURE — 99213 OFFICE O/P EST LOW 20 MIN: CPT | Performed by: INTERNAL MEDICINE

## 2018-09-28 RX ORDER — OMEPRAZOLE 40 MG/1
40 CAPSULE, DELAYED RELEASE ORAL DAILY
Qty: 30 CAPSULE | Refills: 5 | Status: SHIPPED | OUTPATIENT
Start: 2018-09-28 | End: 2019-03-28 | Stop reason: SDUPTHER

## 2018-09-28 RX ORDER — GABAPENTIN 300 MG/1
300 CAPSULE ORAL DAILY
Qty: 30 CAPSULE | Refills: 5 | Status: SHIPPED | OUTPATIENT
Start: 2018-09-28 | End: 2019-03-28 | Stop reason: SDUPTHER

## 2018-09-28 RX ORDER — ATORVASTATIN CALCIUM 10 MG/1
10 TABLET, FILM COATED ORAL DAILY
Qty: 30 TABLET | Refills: 5 | Status: SHIPPED | OUTPATIENT
Start: 2018-09-28 | End: 2019-03-28 | Stop reason: SDUPTHER

## 2018-09-28 RX ORDER — ALLOPURINOL 100 MG/1
100 TABLET ORAL DAILY
Qty: 30 TABLET | Refills: 5 | Status: SHIPPED | OUTPATIENT
Start: 2018-09-28 | End: 2019-09-17 | Stop reason: SDUPTHER

## 2018-09-28 NOTE — PROGRESS NOTES
Subjective chief complaint is follow-up for neuropathy and numbness  Anthony Landers is a 43 y.o. male.     History of Present Illness   Is here today for follow-up.  He does have neuropathy in his feet.  He does take gabapentin once daily.  This does seem to help control this without causing significant daytime side effects.  He also has a history of gout.  He is on low-dose allopurinol and has had no recent gout attacks.  He does use diclofenac for arthritic pain.  He is in need of some lab work to make sure this is not causing any problems.  He also has hypertension.  He is on low-dose losartan.  He also is on some atorvastatin for his cholesterol.  This needs to be checked as well.  The following portions of the patient's history were reviewed and updated as appropriate: allergies, current medications, past medical history and problem list.    Review of Systems   Respiratory: Negative for chest tightness and shortness of breath.    Cardiovascular: Negative for chest pain.   Neurological: Positive for numbness. Negative for dizziness, light-headedness and headache.       Objective   Physical Exam   Constitutional: He appears well-developed and well-nourished.   Neck: Carotid bruit is not present.   Cardiovascular: Normal rate, regular rhythm, normal heart sounds and intact distal pulses.  Exam reveals no gallop and no friction rub.    No murmur heard.  Pulmonary/Chest: Effort normal and breath sounds normal. No respiratory distress. He has no wheezes. He has no rales.   Abdominal: Soft. Bowel sounds are normal. He exhibits no distension and no mass. There is no tenderness. There is no guarding.   Musculoskeletal: He exhibits no edema.   Nursing note and vitals reviewed.        Assessment/Plan   Anthony was seen today for numbness.    Diagnoses and all orders for this visit:    Essential hypertension  -     Comprehensive Metabolic Panel    Hyperlipidemia, unspecified hyperlipidemia type  -     Lipid  Panel    Idiopathic peripheral neuropathy    Idiopathic gout, unspecified chronicity, unspecified site  -     Uric Acid    Other orders  -     gabapentin (NEURONTIN) 300 MG capsule; Take 1 capsule by mouth Daily.  -     allopurinol (ZYLOPRIM) 100 MG tablet; Take 1 tablet by mouth Daily.  -     atorvastatin (LIPITOR) 10 MG tablet; Take 1 tablet by mouth Daily.  -     diclofenac (VOLTAREN) 50 MG EC tablet; Take 1 tablet by mouth 2 (Two) Times a Day.  -     omeprazole (priLOSEC) 40 MG capsule; Take 1 capsule by mouth Daily.     Anthony is here today for follow-up.  We are going to renew his gabapentin.  We are going to check on status of his other medical problems with some laboratories.  If all is well we will see him back in 6 months.

## 2019-02-01 RX ORDER — HYOSCYAMINE SULFATE EXTENDED-RELEASE 0.38 MG/1
0.38 TABLET ORAL EVERY 12 HOURS PRN
Qty: 60 TABLET | Refills: 0 | Status: SHIPPED | OUTPATIENT
Start: 2019-02-01 | End: 2019-03-04 | Stop reason: SDUPTHER

## 2019-02-05 RX ORDER — LOSARTAN POTASSIUM 100 MG/1
100 TABLET ORAL DAILY
Qty: 30 TABLET | Refills: 1 | Status: SHIPPED | OUTPATIENT
Start: 2019-02-05 | End: 2019-03-28 | Stop reason: SDUPTHER

## 2019-03-04 ENCOUNTER — OFFICE VISIT (OUTPATIENT)
Dept: GASTROENTEROLOGY | Facility: CLINIC | Age: 44
End: 2019-03-04

## 2019-03-04 VITALS
WEIGHT: 240.8 LBS | DIASTOLIC BLOOD PRESSURE: 72 MMHG | TEMPERATURE: 97.9 F | HEIGHT: 73 IN | SYSTOLIC BLOOD PRESSURE: 128 MMHG | BODY MASS INDEX: 31.91 KG/M2

## 2019-03-04 DIAGNOSIS — K58.0 IRRITABLE BOWEL SYNDROME WITH DIARRHEA: ICD-10-CM

## 2019-03-04 DIAGNOSIS — K21.9 GASTROESOPHAGEAL REFLUX DISEASE WITHOUT ESOPHAGITIS: Primary | ICD-10-CM

## 2019-03-04 PROCEDURE — 99213 OFFICE O/P EST LOW 20 MIN: CPT | Performed by: NURSE PRACTITIONER

## 2019-03-04 RX ORDER — HYOSCYAMINE SULFATE EXTENDED-RELEASE 0.38 MG/1
0.38 TABLET ORAL EVERY 12 HOURS PRN
Qty: 90 TABLET | Refills: 3 | Status: SHIPPED | OUTPATIENT
Start: 2019-03-04 | End: 2019-09-05 | Stop reason: SDUPTHER

## 2019-03-04 NOTE — PROGRESS NOTES
Chief Complaint   Patient presents with   • Heartburn       Anthony Landers is a  43 y.o. male here for a follow up visit for GERD and irritable bowel syndrome with diarrhea.    HPI     This is an established patient, new to me, seen today in follow up on GERD and IBS diarrhea predominant.  Follows with Dr. Robles.    Regarding GERD, he is doing very well on daily PPI therapy.  Denies breakthrough symptoms, nausea, vomiting, or dysphagia.  His appetite is good.  His weight is stable.  EGD in May 2014 was normal with negative path.    Regarding IBS-D, doing well with antispasmodic, hyoscyamine, as needed several times a week.  Bowels are moving daily.  Diarrhea is triggered with stress or diet.  He avoids triggers when possible.  Colonoscopy in May 2014 with nonbleeding internal hemorrhoids otherwise normal.    Past Medical History:   Diagnosis Date   • Asthma    • GERD (gastroesophageal reflux disease)    • Hypertension    • Irritable bowel syndrome        Past Surgical History:   Procedure Laterality Date   • COLONOSCOPY  05/21/2014    ih   • UPPER GASTROINTESTINAL ENDOSCOPY  05/21/2014    nml       Scheduled Meds:  Outpatient Encounter Medications as of 3/4/2019   Medication Sig Dispense Refill   • allopurinol (ZYLOPRIM) 100 MG tablet Take 1 tablet by mouth Daily. 30 tablet 5   • atorvastatin (LIPITOR) 10 MG tablet Take 1 tablet by mouth Daily. 30 tablet 5   • diclofenac (VOLTAREN) 50 MG EC tablet Take 1 tablet by mouth 2 (Two) Times a Day. 60 tablet 5   • gabapentin (NEURONTIN) 300 MG capsule Take 1 capsule by mouth Daily. 30 capsule 5   • hyoscyamine (LEVBID) 0.375 MG 12 hr tablet Take 1 tablet by mouth Every 12 (Twelve) Hours As Needed for Cramping. 60 tablet 11   • hyoscyamine (LEVBID) 0.375 MG 12 hr tablet Take 1 tablet by mouth Every 12 (Twelve) Hours As Needed for Cramping. ** Please make an appointment for further refills. Thank you. 90 tablet 3   • losartan (COZAAR) 100 MG tablet Take 1 tablet by mouth  Daily. *needs March 2019 appt* 30 tablet 1   • omeprazole (priLOSEC) 40 MG capsule Take 1 capsule by mouth Daily. 30 capsule 5   • [DISCONTINUED] hyoscyamine (LEVBID) 0.375 MG 12 hr tablet Take 1 tablet by mouth Every 12 (Twelve) Hours As Needed for Cramping. ** Please make an appointment for further refills. Thank you. 60 tablet 0     No facility-administered encounter medications on file as of 3/4/2019.        Continuous Infusions:  No current facility-administered medications for this visit.     PRN Meds:.    Allergies   Allergen Reactions   • Propranolol        Social History     Socioeconomic History   • Marital status: Single     Spouse name: Not on file   • Number of children: Not on file   • Years of education: Not on file   • Highest education level: Not on file   Social Needs   • Financial resource strain: Not on file   • Food insecurity - worry: Not on file   • Food insecurity - inability: Not on file   • Transportation needs - medical: Not on file   • Transportation needs - non-medical: Not on file   Occupational History   • Not on file   Tobacco Use   • Smoking status: Never Smoker   • Smokeless tobacco: Never Used   Substance and Sexual Activity   • Alcohol use: No   • Drug use: No   • Sexual activity: Defer   Other Topics Concern   • Not on file   Social History Narrative   • Not on file       History reviewed. No pertinent family history.    Review of Systems   Constitutional: Negative for activity change, appetite change, fatigue, fever and unexpected weight change.   HENT: Negative for trouble swallowing.    Respiratory: Negative for apnea, cough, choking, chest tightness, shortness of breath and wheezing.    Cardiovascular: Negative for chest pain, palpitations and leg swelling.   Gastrointestinal: Negative for abdominal distention, abdominal pain, anal bleeding, blood in stool, constipation, diarrhea, nausea, rectal pain and vomiting.       Vitals:    03/04/19 1010   BP: 128/72   Temp: 97.9 °F  (36.6 °C)       Physical Exam   Constitutional: He is oriented to person, place, and time. He appears well-developed and well-nourished.   Eyes: Pupils are equal, round, and reactive to light.   Neck: Normal range of motion.   Cardiovascular: Normal rate, regular rhythm and normal heart sounds.   Pulmonary/Chest: Effort normal and breath sounds normal. No respiratory distress. He has no wheezes.   Abdominal: Soft. Bowel sounds are normal. He exhibits no distension and no mass. There is no tenderness. There is no guarding. No hernia.   Musculoskeletal: Normal range of motion.   Neurological: He is alert and oriented to person, place, and time.   Skin: Skin is warm and dry. Capillary refill takes less than 2 seconds.   Psychiatric: He has a normal mood and affect. His behavior is normal.       No images are attached to the encounter.    Anthony was seen today for heartburn.    Diagnoses and all orders for this visit:    Gastroesophageal reflux disease without esophagitis    Irritable bowel syndrome with diarrhea    Other orders  -     hyoscyamine (LEVBID) 0.375 MG 12 hr tablet; Take 1 tablet by mouth Every 12 (Twelve) Hours As Needed for Cramping. ** Please make an appointment for further refills. Thank you.      Assessment    #1 GERD, well controlled on PPI.  #2 IBS diarrhea predominant, does well if he avoids stress or dietary triggers.  Uses antispasmodic as needed.    Plan    Continue current PPI.  Continue antispasmodic, refill provided.  Colonoscopy for colon cancer screening at age 48.  Follow-up 1 year or sooner if needed.

## 2019-03-25 RX ORDER — GABAPENTIN 300 MG/1
CAPSULE ORAL
Qty: 30 CAPSULE | OUTPATIENT
Start: 2019-03-25

## 2019-03-28 ENCOUNTER — OFFICE VISIT (OUTPATIENT)
Dept: FAMILY MEDICINE CLINIC | Facility: CLINIC | Age: 44
End: 2019-03-28

## 2019-03-28 VITALS
HEIGHT: 73 IN | BODY MASS INDEX: 32.6 KG/M2 | SYSTOLIC BLOOD PRESSURE: 120 MMHG | OXYGEN SATURATION: 98 % | HEART RATE: 89 BPM | WEIGHT: 246 LBS | TEMPERATURE: 97.6 F | DIASTOLIC BLOOD PRESSURE: 84 MMHG

## 2019-03-28 DIAGNOSIS — I10 ESSENTIAL HYPERTENSION: Primary | ICD-10-CM

## 2019-03-28 DIAGNOSIS — E78.5 HYPERLIPIDEMIA, UNSPECIFIED HYPERLIPIDEMIA TYPE: ICD-10-CM

## 2019-03-28 DIAGNOSIS — M10.00 IDIOPATHIC GOUT, UNSPECIFIED CHRONICITY, UNSPECIFIED SITE: ICD-10-CM

## 2019-03-28 DIAGNOSIS — Z12.5 SCREENING PSA (PROSTATE SPECIFIC ANTIGEN): ICD-10-CM

## 2019-03-28 DIAGNOSIS — G60.9 IDIOPATHIC PERIPHERAL NEUROPATHY: ICD-10-CM

## 2019-03-28 DIAGNOSIS — M76.899 TENDONITIS OF KNEE: ICD-10-CM

## 2019-03-28 PROCEDURE — 99214 OFFICE O/P EST MOD 30 MIN: CPT | Performed by: INTERNAL MEDICINE

## 2019-03-28 RX ORDER — GABAPENTIN 300 MG/1
300 CAPSULE ORAL DAILY
Qty: 30 CAPSULE | Refills: 5 | Status: SHIPPED | OUTPATIENT
Start: 2019-03-28 | End: 2019-11-27 | Stop reason: SDUPTHER

## 2019-03-28 RX ORDER — ATORVASTATIN CALCIUM 10 MG/1
10 TABLET, FILM COATED ORAL DAILY
Qty: 30 TABLET | Refills: 5 | Status: SHIPPED | OUTPATIENT
Start: 2019-03-28 | End: 2019-10-28 | Stop reason: SDUPTHER

## 2019-03-28 RX ORDER — GABAPENTIN 300 MG/1
300 CAPSULE ORAL DAILY
Qty: 30 CAPSULE | Refills: 5 | Status: SHIPPED | OUTPATIENT
Start: 2019-03-28 | End: 2019-03-28 | Stop reason: SDUPTHER

## 2019-03-28 RX ORDER — LOSARTAN POTASSIUM 100 MG/1
100 TABLET ORAL DAILY
Qty: 30 TABLET | Refills: 1 | Status: SHIPPED | OUTPATIENT
Start: 2019-03-28 | End: 2019-04-28 | Stop reason: SDUPTHER

## 2019-03-28 RX ORDER — OMEPRAZOLE 40 MG/1
40 CAPSULE, DELAYED RELEASE ORAL DAILY
Qty: 30 CAPSULE | Refills: 5 | Status: SHIPPED | OUTPATIENT
Start: 2019-03-28 | End: 2019-10-05 | Stop reason: SDUPTHER

## 2019-03-28 NOTE — PROGRESS NOTES
Subjective Chief complaint is follow-up on hypertension but also knee pain  Anthony Landers is a 44 y.o. male.     History of Present Illness   Anthony is here today for follow-up on his hypertension.  He currently is on some losartan.  He is unaware if this is a recall product.  He has been feeling well.  He has been doing some hiking.  He is not having any chest pain or shortness of breath.  He does have some moderate intermittent pain in his right knee for several months.  This is on the medial aspect of the knee.  Seems to be worse when climbing hills or going up steps.  He does report that he went to a knee doctor who did x-rays.  Apparently there is some minor arthritis.  Past history is remarkable for gout but the knee is not been swollen.  He is on some allopurinol at a low dose.  He does have peripheral neuropathy.  He is on gabapentin.    The following portions of the patient's history were reviewed and updated as appropriate: allergies, current medications, past family history, past medical history, past social history, past surgical history and problem list.    Review of Systems   Respiratory: Negative for chest tightness.    Cardiovascular: Negative for chest pain and leg swelling.   Musculoskeletal: Negative for joint swelling.       Objective   Physical Exam   Constitutional: He appears well-developed and well-nourished.   Neck: Carotid bruit is not present.   Cardiovascular: Normal rate, regular rhythm, normal heart sounds and intact distal pulses. Exam reveals no gallop and no friction rub.   No murmur heard.  Pulmonary/Chest: Effort normal and breath sounds normal. No respiratory distress. He has no wheezes. He has no rales.   Abdominal: Soft. Bowel sounds are normal. He exhibits no distension and no mass. There is no tenderness. There is no guarding.   Genitourinary: Prostate normal.   Genitourinary Comments: There are some palpable internal hemorrhoids.   Musculoskeletal: He exhibits no edema.    There is tenderness at the quadriceps insertion medially.  There is no joint effusion.  There is some tenderness to the medial joint line of the right knee.   Nursing note and vitals reviewed.        Assessment/Plan   Anthony was seen today for hypertension and med refill.    Diagnoses and all orders for this visit:    Essential hypertension  -     Comprehensive Metabolic Panel  -     CBC & Differential    Hyperlipidemia, unspecified hyperlipidemia type  -     Lipid Panel    Idiopathic gout, unspecified chronicity, unspecified site  -     Uric Acid    Idiopathic peripheral neuropathy    Tendonitis of knee    Screening PSA (prostate specific antigen)  -     PSA Screen    Other orders  -     atorvastatin (LIPITOR) 10 MG tablet; Take 1 tablet by mouth Daily.  -     Discontinue: gabapentin (NEURONTIN) 300 MG capsule; Take 1 capsule by mouth Daily.  -     losartan (COZAAR) 100 MG tablet; Take 1 tablet by mouth Daily. *needs March 2019 appt*  -     omeprazole (priLOSEC) 40 MG capsule; Take 1 capsule by mouth Daily.  -     gabapentin (NEURONTIN) 300 MG capsule; Take 1 capsule by mouth Daily.  -     diclofenac (VOLTAREN) 50 MG EC tablet; Take 1 tablet by mouth 2 (Two) Times a Day.      Anthony is here today for follow-up.  We are going to check on the status of his cholesterol.  We will check a uric acid level because of his gout however I feel like the knee pain is likely tendinitis.  He can use some of the diclofenac for this.  We are going to check his kidney labs and blood count because he is on the diclofenac.

## 2019-03-29 LAB
ALBUMIN SERPL-MCNC: 4.5 G/DL (ref 3.5–5.2)
ALBUMIN/GLOB SERPL: 1.7 G/DL
ALP SERPL-CCNC: 80 U/L (ref 39–117)
ALT SERPL-CCNC: 43 U/L (ref 1–41)
AST SERPL-CCNC: 27 U/L (ref 1–40)
BASOPHILS # BLD AUTO: 0.06 10*3/MM3 (ref 0–0.2)
BASOPHILS NFR BLD AUTO: 0.7 % (ref 0–1.5)
BILIRUB SERPL-MCNC: 0.4 MG/DL (ref 0.2–1.2)
BUN SERPL-MCNC: 17 MG/DL (ref 6–20)
BUN/CREAT SERPL: 17.7 (ref 7–25)
CALCIUM SERPL-MCNC: 9.4 MG/DL (ref 8.6–10.5)
CHLORIDE SERPL-SCNC: 103 MMOL/L (ref 98–107)
CHOLEST SERPL-MCNC: 182 MG/DL (ref 0–200)
CO2 SERPL-SCNC: 28.4 MMOL/L (ref 22–29)
CREAT SERPL-MCNC: 0.96 MG/DL (ref 0.76–1.27)
EOSINOPHIL # BLD AUTO: 0.34 10*3/MM3 (ref 0–0.4)
EOSINOPHIL NFR BLD AUTO: 4.2 % (ref 0.3–6.2)
ERYTHROCYTE [DISTWIDTH] IN BLOOD BY AUTOMATED COUNT: 14.1 % (ref 12.3–15.4)
GLOBULIN SER CALC-MCNC: 2.7 GM/DL
GLUCOSE SERPL-MCNC: 96 MG/DL (ref 65–99)
HCT VFR BLD AUTO: 52.6 % (ref 37.5–51)
HDLC SERPL-MCNC: 37 MG/DL (ref 40–60)
HGB BLD-MCNC: 16.3 G/DL (ref 13–17.7)
IMM GRANULOCYTES # BLD AUTO: 0.03 10*3/MM3 (ref 0–0.05)
IMM GRANULOCYTES NFR BLD AUTO: 0.4 % (ref 0–0.5)
LDLC SERPL CALC-MCNC: 109 MG/DL (ref 0–100)
LYMPHOCYTES # BLD AUTO: 1.7 10*3/MM3 (ref 0.7–3.1)
LYMPHOCYTES NFR BLD AUTO: 21 % (ref 19.6–45.3)
MCH RBC QN AUTO: 27.1 PG (ref 26.6–33)
MCHC RBC AUTO-ENTMCNC: 31 G/DL (ref 31.5–35.7)
MCV RBC AUTO: 87.4 FL (ref 79–97)
MONOCYTES # BLD AUTO: 0.75 10*3/MM3 (ref 0.1–0.9)
MONOCYTES NFR BLD AUTO: 9.3 % (ref 5–12)
NEUTROPHILS # BLD AUTO: 5.21 10*3/MM3 (ref 1.4–7)
NEUTROPHILS NFR BLD AUTO: 64.4 % (ref 42.7–76)
NRBC BLD AUTO-RTO: 0 /100 WBC (ref 0–0)
PLATELET # BLD AUTO: 291 10*3/MM3 (ref 140–450)
POTASSIUM SERPL-SCNC: 4.9 MMOL/L (ref 3.5–5.2)
PROT SERPL-MCNC: 7.2 G/DL (ref 6–8.5)
PSA SERPL-MCNC: 0.68 NG/ML (ref 0–4)
RBC # BLD AUTO: 6.02 10*6/MM3 (ref 4.14–5.8)
SODIUM SERPL-SCNC: 144 MMOL/L (ref 136–145)
TRIGL SERPL-MCNC: 178 MG/DL (ref 0–150)
URATE SERPL-MCNC: 6.9 MG/DL (ref 3.4–7)
VLDLC SERPL CALC-MCNC: 35.6 MG/DL (ref 5–40)
WBC # BLD AUTO: 8.09 10*3/MM3 (ref 3.4–10.8)

## 2019-04-29 RX ORDER — LOSARTAN POTASSIUM 100 MG/1
100 TABLET ORAL DAILY
Qty: 30 TABLET | Refills: 0 | Status: SHIPPED | OUTPATIENT
Start: 2019-04-29 | End: 2019-05-25 | Stop reason: SDUPTHER

## 2019-05-28 RX ORDER — LOSARTAN POTASSIUM 100 MG/1
100 TABLET ORAL DAILY
Qty: 30 TABLET | Refills: 2 | Status: SHIPPED | OUTPATIENT
Start: 2019-05-28 | End: 2019-08-28 | Stop reason: SDUPTHER

## 2019-08-28 RX ORDER — LOSARTAN POTASSIUM 100 MG/1
TABLET ORAL
Qty: 30 TABLET | Refills: 2 | Status: SHIPPED | OUTPATIENT
Start: 2019-08-28 | End: 2019-11-18 | Stop reason: SDUPTHER

## 2019-09-05 ENCOUNTER — OFFICE VISIT (OUTPATIENT)
Dept: GASTROENTEROLOGY | Facility: CLINIC | Age: 44
End: 2019-09-05

## 2019-09-05 VITALS
WEIGHT: 252.8 LBS | TEMPERATURE: 98.1 F | HEIGHT: 73 IN | SYSTOLIC BLOOD PRESSURE: 134 MMHG | DIASTOLIC BLOOD PRESSURE: 80 MMHG | BODY MASS INDEX: 33.5 KG/M2

## 2019-09-05 DIAGNOSIS — K21.9 GASTROESOPHAGEAL REFLUX DISEASE WITHOUT ESOPHAGITIS: ICD-10-CM

## 2019-09-05 DIAGNOSIS — K58.0 IRRITABLE BOWEL SYNDROME WITH DIARRHEA: Primary | ICD-10-CM

## 2019-09-05 PROCEDURE — 99214 OFFICE O/P EST MOD 30 MIN: CPT | Performed by: NURSE PRACTITIONER

## 2019-09-05 RX ORDER — HYOSCYAMINE SULFATE EXTENDED-RELEASE 0.38 MG/1
0.38 TABLET ORAL EVERY 12 HOURS PRN
Qty: 180 TABLET | Refills: 4 | Status: SHIPPED | OUTPATIENT
Start: 2019-09-05 | End: 2020-10-01

## 2019-09-05 RX ORDER — NITAZOXANIDE 500 MG/1
500 TABLET ORAL 2 TIMES DAILY WITH MEALS
Qty: 20 TABLET | Refills: 0 | Status: SHIPPED | OUTPATIENT
Start: 2019-09-05 | End: 2019-09-15

## 2019-09-05 NOTE — PROGRESS NOTES
Chief Complaint   Patient presents with   • Medication refill needed - IBS     HPI    Anthony Landers is a  44 y.o. male here for a follow up visit for GERD and irritable bowel syndrome diarrhea predominant.  This patient follows with Dr. Robles.    Regarding GERD, currently well controlled on daily PPI therapy.  Denies breakthrough symptoms, nausea, vomiting, or dysphagia.  His appetite is good.  Weight is stable.  EGD in May 2020 214 was normal with negative path.    Regarding irritable bowel syndrome diarrhea predominant, has been well maintained on antispasmodics.  Lately he has had increase in urgency and bloating/gas.  Needs a refill on antispasmodics.  Bowels are moving several times a day with formed to mushy stools.  Denies rectal bleeding.  No nocturnal symptoms.    Past Medical History:   Diagnosis Date   • Asthma    • GERD (gastroesophageal reflux disease)    • Hypertension    • Irritable bowel syndrome        Past Surgical History:   Procedure Laterality Date   • COLONOSCOPY  05/21/2014    ih   • UPPER GASTROINTESTINAL ENDOSCOPY  05/21/2014    nml       Scheduled Meds:  Outpatient Encounter Medications as of 9/5/2019   Medication Sig Dispense Refill   • allopurinol (ZYLOPRIM) 100 MG tablet Take 1 tablet by mouth Daily. 30 tablet 5   • atorvastatin (LIPITOR) 10 MG tablet Take 1 tablet by mouth Daily. 30 tablet 5   • diclofenac (VOLTAREN) 50 MG EC tablet Take 1 tablet by mouth 2 (Two) Times a Day. 60 tablet 5   • gabapentin (NEURONTIN) 300 MG capsule Take 1 capsule by mouth Daily. 30 capsule 5   • hyoscyamine (LEVBID) 0.375 MG 12 hr tablet Take 1 tablet by mouth Every 12 (Twelve) Hours As Needed for Cramping or Diarrhea. 180 tablet 4   • losartan (COZAAR) 100 MG tablet TAKE 1 TABLET BY MOUTH EVERY DAY 30 tablet 2   • omeprazole (priLOSEC) 40 MG capsule Take 1 capsule by mouth Daily. 30 capsule 5   • [DISCONTINUED] hyoscyamine (LEVBID) 0.375 MG 12 hr tablet Take 1 tablet by mouth Every 12 (Twelve) Hours As  Needed for Cramping. ** Please make an appointment for further refills. Thank you. 90 tablet 3   • nitazoxanide (ALINIA) 500 MG tablet Take 1 tablet by mouth 2 (Two) Times a Day With Meals for 10 days. 20 tablet 0   • [DISCONTINUED] hyoscyamine (LEVBID) 0.375 MG 12 hr tablet Take 1 tablet by mouth Every 12 (Twelve) Hours As Needed for Cramping. 60 tablet 11     No facility-administered encounter medications on file as of 9/5/2019.        Continuous Infusions:  No current facility-administered medications for this visit.     PRN Meds:.    Allergies   Allergen Reactions   • Propranolol        Social History     Socioeconomic History   • Marital status: Single     Spouse name: Not on file   • Number of children: Not on file   • Years of education: Not on file   • Highest education level: Not on file   Tobacco Use   • Smoking status: Never Smoker   • Smokeless tobacco: Never Used   Substance and Sexual Activity   • Alcohol use: No   • Drug use: No   • Sexual activity: Defer       History reviewed. No pertinent family history.    Review of Systems   Constitutional: Negative for activity change, appetite change, fatigue, fever and unexpected weight change.   HENT: Negative for trouble swallowing.    Respiratory: Negative for apnea, cough, choking, chest tightness, shortness of breath and wheezing.    Cardiovascular: Negative for chest pain, palpitations and leg swelling.   Gastrointestinal: Negative for abdominal distention, abdominal pain, anal bleeding, blood in stool, constipation, diarrhea, nausea, rectal pain and vomiting.        + bloating/gas and urgency        Vitals:    09/05/19 1103   BP: 134/80   Temp: 98.1 °F (36.7 °C)       Physical Exam   Constitutional: He is oriented to person, place, and time. He appears well-developed and well-nourished.   Eyes: Pupils are equal, round, and reactive to light.   Cardiovascular: Normal rate, regular rhythm and normal heart sounds.   Pulmonary/Chest: Effort normal and  breath sounds normal. No respiratory distress. He has no wheezes.   Abdominal: Soft. Bowel sounds are normal. He exhibits no distension and no mass. There is no tenderness. There is no guarding. No hernia.   Musculoskeletal: Normal range of motion.   Neurological: He is alert and oriented to person, place, and time.   Skin: Skin is warm and dry. Capillary refill takes less than 2 seconds.   Psychiatric: He has a normal mood and affect. His behavior is normal.       No images are attached to the encounter.    Anthony was seen today for medication refill needed - ibs.    Diagnoses and all orders for this visit:    Irritable bowel syndrome with diarrhea    Gastroesophageal reflux disease without esophagitis    Other orders  -     nitazoxanide (ALINIA) 500 MG tablet; Take 1 tablet by mouth 2 (Two) Times a Day With Meals for 10 days.  -     hyoscyamine (LEVBID) 0.375 MG 12 hr tablet; Take 1 tablet by mouth Every 12 (Twelve) Hours As Needed for Cramping or Diarrhea.    Impression:    Stable GERD, will continue daily PPI therapy.  Did caution patient against NSAID use.  Also discussed GERD diet lifestyle modifications.    Irritable bowel syndrome currently having issues with urgency and excessive bloating and gas recommend course of Alinia to empirically treat SIBO.  Refill of antispasmodic provided.  Also recommended patient start a good daily probiotic such as align, Culturelle, Paco, March colon health after he finishes round of Alinia.    Patient to call us with any questions or concerns otherwise follow in the office 1 year.

## 2019-09-06 ENCOUNTER — TELEPHONE (OUTPATIENT)
Dept: GASTROENTEROLOGY | Facility: CLINIC | Age: 44
End: 2019-09-06

## 2019-09-06 NOTE — TELEPHONE ENCOUNTER
Patient called, advised his prescription has been called into [facility]. Advised if he gets a phone call from area code [phone] to answer the phone, it will not be a bogus call. Advised to call back for questions.

## 2019-09-06 NOTE — TELEPHONE ENCOUNTER
----- Message from Darryl Mcdonough sent at 9/6/2019  2:18 PM EDT -----  Regarding: GAGAN  Contact: [phone]  PT STATED CAN'T AFFORD 500 FOR MED

## 2019-09-09 NOTE — TELEPHONE ENCOUNTER
"Per a staff message: \"Patricia from pharmacy needs some more info please call [phone].\"     Getting a rejection that insurance is not active. They have [facility] on file. Advised we have [facility] as [facility] active insurance carrier and it does appear to be his prescription insurance carrier as well. All information given to the billing department. She tried running script, but keeps getting rejection that she needs update Rxgroup and BIN number. She will make some calls and give us a call back, hopefully with a copay amount for pt.   "

## 2019-09-17 RX ORDER — ALLOPURINOL 100 MG/1
TABLET ORAL
Qty: 30 TABLET | Refills: 5 | Status: SHIPPED | OUTPATIENT
Start: 2019-09-17 | End: 2020-03-18

## 2019-10-07 RX ORDER — OMEPRAZOLE 40 MG/1
CAPSULE, DELAYED RELEASE ORAL
Qty: 30 CAPSULE | Refills: 5 | Status: SHIPPED | OUTPATIENT
Start: 2019-10-07 | End: 2020-04-13

## 2019-10-21 RX ORDER — ATORVASTATIN CALCIUM 10 MG/1
TABLET, FILM COATED ORAL
Qty: 30 TABLET | Refills: 5 | OUTPATIENT
Start: 2019-10-21

## 2019-10-28 RX ORDER — ATORVASTATIN CALCIUM 10 MG/1
10 TABLET, FILM COATED ORAL DAILY
Qty: 90 TABLET | Refills: 1 | Status: SHIPPED | OUTPATIENT
Start: 2019-10-28 | End: 2020-04-20

## 2019-11-18 RX ORDER — LOSARTAN POTASSIUM 100 MG/1
100 TABLET ORAL DAILY
Qty: 15 TABLET | Refills: 0 | Status: SHIPPED | OUTPATIENT
Start: 2019-11-18 | End: 2019-12-06 | Stop reason: SDUPTHER

## 2019-11-18 RX ORDER — LOSARTAN POTASSIUM 100 MG/1
TABLET ORAL
Qty: 30 TABLET | Refills: 2 | OUTPATIENT
Start: 2019-11-18

## 2019-11-26 ENCOUNTER — TELEPHONE (OUTPATIENT)
Dept: FAMILY MEDICINE CLINIC | Facility: CLINIC | Age: 44
End: 2019-11-26

## 2019-11-26 NOTE — TELEPHONE ENCOUNTER
Patient called upset that his gabapentin rx was denied & wanted to know why.   I informed him his last OV was 3.2019 which was most likely the reason why it was denied.    He scheduled appt for next Wed at 11:15am (he said 7:15 am on Thanksgiving Magal[place] was too early since he works late.)    Asking if he can enough called in until his appt? I told him I would send you message since I could not authorize.

## 2019-11-27 RX ORDER — GABAPENTIN 300 MG/1
300 CAPSULE ORAL DAILY
Qty: 10 CAPSULE | Refills: 0 | Status: SHIPPED | OUTPATIENT
Start: 2019-11-27 | End: 2019-12-06 | Stop reason: SDUPTHER

## 2019-12-06 ENCOUNTER — OFFICE VISIT (OUTPATIENT)
Dept: FAMILY MEDICINE CLINIC | Facility: CLINIC | Age: 44
End: 2019-12-06

## 2019-12-06 VITALS
HEART RATE: 88 BPM | HEIGHT: 73 IN | BODY MASS INDEX: 32.6 KG/M2 | SYSTOLIC BLOOD PRESSURE: 120 MMHG | TEMPERATURE: 97.8 F | DIASTOLIC BLOOD PRESSURE: 80 MMHG | WEIGHT: 246 LBS | OXYGEN SATURATION: 96 %

## 2019-12-06 DIAGNOSIS — G60.9 IDIOPATHIC PERIPHERAL NEUROPATHY: ICD-10-CM

## 2019-12-06 DIAGNOSIS — E78.5 HYPERLIPIDEMIA, UNSPECIFIED HYPERLIPIDEMIA TYPE: ICD-10-CM

## 2019-12-06 DIAGNOSIS — I10 ESSENTIAL HYPERTENSION: Primary | ICD-10-CM

## 2019-12-06 PROCEDURE — 99213 OFFICE O/P EST LOW 20 MIN: CPT | Performed by: INTERNAL MEDICINE

## 2019-12-06 RX ORDER — LOSARTAN POTASSIUM 100 MG/1
100 TABLET ORAL DAILY
Qty: 90 TABLET | Refills: 1 | Status: SHIPPED | OUTPATIENT
Start: 2019-12-06 | End: 2020-05-27 | Stop reason: RX

## 2019-12-06 RX ORDER — GABAPENTIN 300 MG/1
300 CAPSULE ORAL DAILY
Qty: 90 CAPSULE | Refills: 1 | Status: SHIPPED | OUTPATIENT
Start: 2019-12-06 | End: 2020-06-04

## 2019-12-06 NOTE — PROGRESS NOTES
Subjective Chief complaint is follow-up for blood pressure medication refills  Anthony Landers is a 44 y.o. male.     History of Present Illness   Anthony is here today for follow-up.  He does have hypertension.  His blood pressure typically has been well controlled with 100 mg of losartan daily.  He has lost approximately 6 pounds since his last visit.  He does remain active in hiking despite some knee pain and osteoarthritis of the knees.  He did see a nonsurgical knee specialist but could not afford the knee injections.  He does have some neuropathy pain.  He does take gabapentin at night.  He is in need of a refill on this.  It is not causing any excessive daytime drowsiness.  He also has some hyperlipidemia and history of gout.  He has not had any recent gouty attacks.  His last cholesterol testing looked okay.  The following portions of the patient's history were reviewed and updated as appropriate: allergies, current medications, past family history, past medical history, past social history, past surgical history and problem list.    Review of Systems   Respiratory: Negative for chest tightness and shortness of breath.    Cardiovascular: Positive for chest pain.   Musculoskeletal: Positive for gait problem and joint swelling.   Neurological: Negative for dizziness, light-headedness and headache.       Objective   Physical Exam   Constitutional: He appears well-developed and well-nourished.   Cardiovascular: Normal rate, regular rhythm and normal heart sounds.   Pulmonary/Chest: Effort normal and breath sounds normal.   Musculoskeletal:   He does have some mild crepitation and decreased flexion of the right knee.  Most of his tenderness however is at the muscular and tendinous insertions rather than at the joint line.   Nursing note and vitals reviewed.        Assessment/Plan   Anthony was seen today for hypertension and med refill.    Diagnoses and all orders for this visit:    Essential  hypertension    Hyperlipidemia, unspecified hyperlipidemia type    Idiopathic peripheral neuropathy    Other orders  -     gabapentin (NEURONTIN) 300 MG capsule; Take 1 capsule by mouth Daily.  -     losartan (COZAAR) 100 MG tablet; Take 1 tablet by mouth Daily.      Anthony is here today for follow-up on several medical problems.  All of these are stable and no changes in his medication were made.  I did advise him that with the gabapentin he will need to make every 6-month appointments.  I advised him to set up a morning appointment in 6 months we would do some fasting labs then.

## 2020-03-18 RX ORDER — ALLOPURINOL 100 MG/1
TABLET ORAL
Qty: 30 TABLET | Refills: 5 | Status: SHIPPED | OUTPATIENT
Start: 2020-03-18 | End: 2020-09-11

## 2020-04-13 RX ORDER — OMEPRAZOLE 40 MG/1
CAPSULE, DELAYED RELEASE ORAL
Qty: 30 CAPSULE | Refills: 2 | Status: SHIPPED | OUTPATIENT
Start: 2020-04-13 | End: 2020-07-13

## 2020-04-20 RX ORDER — ATORVASTATIN CALCIUM 10 MG/1
TABLET, FILM COATED ORAL
Qty: 30 TABLET | Refills: 2 | Status: SHIPPED | OUTPATIENT
Start: 2020-04-20 | End: 2020-07-21

## 2020-05-27 ENCOUNTER — TELEPHONE (OUTPATIENT)
Dept: FAMILY MEDICINE CLINIC | Facility: CLINIC | Age: 45
End: 2020-05-27

## 2020-05-27 RX ORDER — VALSARTAN 160 MG/1
TABLET ORAL
Qty: 30 TABLET | Refills: 2 | Status: SHIPPED | OUTPATIENT
Start: 2020-05-27 | End: 2020-06-05 | Stop reason: RX

## 2020-05-27 NOTE — TELEPHONE ENCOUNTER
PATIENT CALLING IN TO REPORT THAT THE LOSARTAN IS ON BACK ORDER.  PLEASE SEND IN ANOTHER PRESCRIPTION.    VERIFIED [id number] - [place], [place] - [address] AT [facility] [phone] [facility] [phone]     PATIENT CONTACT NUMBER [phone]

## 2020-06-04 RX ORDER — GABAPENTIN 300 MG/1
CAPSULE ORAL
Qty: 30 CAPSULE | Refills: 5 | Status: SHIPPED | OUTPATIENT
Start: 2020-06-04 | End: 2020-12-10 | Stop reason: SDUPTHER

## 2020-06-05 ENCOUNTER — OFFICE VISIT (OUTPATIENT)
Dept: FAMILY MEDICINE CLINIC | Facility: CLINIC | Age: 45
End: 2020-06-05

## 2020-06-05 VITALS
BODY MASS INDEX: 33 KG/M2 | TEMPERATURE: 97.2 F | SYSTOLIC BLOOD PRESSURE: 130 MMHG | HEART RATE: 103 BPM | OXYGEN SATURATION: 97 % | DIASTOLIC BLOOD PRESSURE: 90 MMHG | HEIGHT: 73 IN | WEIGHT: 249 LBS

## 2020-06-05 DIAGNOSIS — M17.0 PRIMARY OSTEOARTHRITIS OF BOTH KNEES: ICD-10-CM

## 2020-06-05 DIAGNOSIS — I10 ESSENTIAL HYPERTENSION: Primary | ICD-10-CM

## 2020-06-05 DIAGNOSIS — E78.5 HYPERLIPIDEMIA, UNSPECIFIED HYPERLIPIDEMIA TYPE: ICD-10-CM

## 2020-06-05 DIAGNOSIS — M10.00 IDIOPATHIC GOUT, UNSPECIFIED CHRONICITY, UNSPECIFIED SITE: ICD-10-CM

## 2020-06-05 PROCEDURE — 99213 OFFICE O/P EST LOW 20 MIN: CPT | Performed by: INTERNAL MEDICINE

## 2020-06-05 RX ORDER — IRBESARTAN 150 MG/1
150 TABLET ORAL NIGHTLY
Qty: 90 TABLET | Refills: 1 | Status: SHIPPED | OUTPATIENT
Start: 2020-06-05 | End: 2020-12-10 | Stop reason: SDUPTHER

## 2020-06-05 NOTE — PROGRESS NOTES
Subjective Chief complaint is follow-up on blood pressure but also knee pain  Anthony Landers is a 45 y.o. male.     History of Present Illness Anthony is here today to follow-up on his blood pressure.  He is on some valsartan.  It has been recalled and we are going to substitute Sherin Mosqueda.  He has been using diclofenac for some significant osteoarthritis symptoms in his knees.  This seems to be getting worse and is now wanting to see an orthopedist.  He may need some injection therapy.  He does have hyperlipidemia.  His last cholesterol check was over a year ago.  He is due for that to be checked today.  He does take gabapentin for neuropathy in his feet.  The following portions of the patient's history were reviewed and updated as appropriate: allergies, current medications, past family history, past medical history, past social history, past surgical history and problem list.    Review of Systems   Respiratory: Negative for chest tightness and shortness of breath.    Cardiovascular: Negative for chest pain.   Musculoskeletal: Positive for gait problem and joint swelling.       Objective   Physical Exam   Constitutional: He appears well-developed and well-nourished.   Neck: Carotid bruit is not present.   Cardiovascular: Normal rate, regular rhythm, normal heart sounds and intact distal pulses. Exam reveals no gallop and no friction rub.   No murmur heard.  Pulmonary/Chest: Effort normal and breath sounds normal. No respiratory distress. He has no wheezes. He has no rales.   Abdominal: Soft.   Musculoskeletal: He exhibits no edema.   Nursing note and vitals reviewed.        Assessment/Plan   Anthony was seen today for hypertension and med refill.    Diagnoses and all orders for this visit:    Essential hypertension  -     Comprehensive Metabolic Panel    Hyperlipidemia, unspecified hyperlipidemia type  -     Lipid Panel    Primary osteoarthritis of both knees  -     CBC & Differential  -     Ambulatory Referral to  Orthopedic Surgery    Idiopathic gout, unspecified chronicity, unspecified site  -     Uric Acid    Other orders  -     irbesartan (AVAPRO) 150 MG tablet; Take 1 tablet by mouth Every Night.  -     diclofenac (VOLTAREN) 50 MG EC tablet; Take 1 tablet by mouth 2 (Two) Times a Day.      Anthony is here today for follow-up.  He seems to be doing fairly well.  We do have to substitute some irbesartan for his valsartan.  I am going to refer him to an orthopedist regarding possible knee injections and x-rays.

## 2020-06-05 NOTE — PROGRESS NOTES
Subjective   Anthony Landers is a 45 y.o. male.     History of Present Illness     {Common H&P Review Areas:55146}    Review of Systems    Objective   Physical Exam      Assessment/Plan   {Assess/PlanSmartLinks:11903}

## 2020-06-06 LAB
ALBUMIN SERPL-MCNC: 4.6 G/DL (ref 3.5–5.2)
ALBUMIN/GLOB SERPL: 1.8 G/DL
ALP SERPL-CCNC: 82 U/L (ref 39–117)
ALT SERPL-CCNC: 38 U/L (ref 1–41)
AST SERPL-CCNC: 27 U/L (ref 1–40)
BASOPHILS # BLD AUTO: 0.06 10*3/MM3 (ref 0–0.2)
BASOPHILS NFR BLD AUTO: 0.7 % (ref 0–1.5)
BILIRUB SERPL-MCNC: 0.4 MG/DL (ref 0.2–1.2)
BUN SERPL-MCNC: 20 MG/DL (ref 6–20)
BUN/CREAT SERPL: 18.5 (ref 7–25)
CALCIUM SERPL-MCNC: 9.6 MG/DL (ref 8.6–10.5)
CHLORIDE SERPL-SCNC: 104 MMOL/L (ref 98–107)
CHOLEST SERPL-MCNC: 175 MG/DL (ref 0–200)
CO2 SERPL-SCNC: 30.4 MMOL/L (ref 22–29)
CREAT SERPL-MCNC: 1.08 MG/DL (ref 0.76–1.27)
EOSINOPHIL # BLD AUTO: 0.31 10*3/MM3 (ref 0–0.4)
EOSINOPHIL NFR BLD AUTO: 3.6 % (ref 0.3–6.2)
ERYTHROCYTE [DISTWIDTH] IN BLOOD BY AUTOMATED COUNT: 13.6 % (ref 12.3–15.4)
GLOBULIN SER CALC-MCNC: 2.6 GM/DL
GLUCOSE SERPL-MCNC: 109 MG/DL (ref 65–99)
HCT VFR BLD AUTO: 47.8 % (ref 37.5–51)
HDLC SERPL-MCNC: 33 MG/DL (ref 40–60)
HGB BLD-MCNC: 15.3 G/DL (ref 13–17.7)
IMM GRANULOCYTES # BLD AUTO: 0.03 10*3/MM3 (ref 0–0.05)
IMM GRANULOCYTES NFR BLD AUTO: 0.4 % (ref 0–0.5)
LDLC SERPL CALC-MCNC: 99 MG/DL (ref 0–100)
LYMPHOCYTES # BLD AUTO: 1.56 10*3/MM3 (ref 0.7–3.1)
LYMPHOCYTES NFR BLD AUTO: 18.2 % (ref 19.6–45.3)
MCH RBC QN AUTO: 27.3 PG (ref 26.6–33)
MCHC RBC AUTO-ENTMCNC: 32 G/DL (ref 31.5–35.7)
MCV RBC AUTO: 85.4 FL (ref 79–97)
MONOCYTES # BLD AUTO: 0.76 10*3/MM3 (ref 0.1–0.9)
MONOCYTES NFR BLD AUTO: 8.9 % (ref 5–12)
NEUTROPHILS # BLD AUTO: 5.83 10*3/MM3 (ref 1.7–7)
NEUTROPHILS NFR BLD AUTO: 68.2 % (ref 42.7–76)
NRBC BLD AUTO-RTO: 0 /100 WBC (ref 0–0.2)
PLATELET # BLD AUTO: 280 10*3/MM3 (ref 140–450)
POTASSIUM SERPL-SCNC: 4.9 MMOL/L (ref 3.5–5.2)
PROT SERPL-MCNC: 7.2 G/DL (ref 6–8.5)
RBC # BLD AUTO: 5.6 10*6/MM3 (ref 4.14–5.8)
SODIUM SERPL-SCNC: 141 MMOL/L (ref 136–145)
TRIGL SERPL-MCNC: 213 MG/DL (ref 0–150)
URATE SERPL-MCNC: 7.1 MG/DL (ref 3.4–7)
VLDLC SERPL CALC-MCNC: 42.6 MG/DL
WBC # BLD AUTO: 8.55 10*3/MM3 (ref 3.4–10.8)

## 2020-07-13 RX ORDER — OMEPRAZOLE 40 MG/1
CAPSULE, DELAYED RELEASE ORAL
Qty: 30 CAPSULE | Refills: 5 | Status: SHIPPED | OUTPATIENT
Start: 2020-07-13 | End: 2020-12-01 | Stop reason: SDUPTHER

## 2020-07-21 RX ORDER — ATORVASTATIN CALCIUM 10 MG/1
TABLET, FILM COATED ORAL
Qty: 30 TABLET | Refills: 2 | Status: SHIPPED | OUTPATIENT
Start: 2020-07-21 | End: 2020-10-20

## 2020-07-30 ENCOUNTER — OFFICE VISIT (OUTPATIENT)
Dept: ORTHOPEDIC SURGERY | Facility: CLINIC | Age: 45
End: 2020-07-30

## 2020-07-30 VITALS — WEIGHT: 250 LBS | BODY MASS INDEX: 33.13 KG/M2 | HEIGHT: 73 IN | TEMPERATURE: 98 F

## 2020-07-30 DIAGNOSIS — M25.561 PAIN IN BOTH KNEES, UNSPECIFIED CHRONICITY: Primary | ICD-10-CM

## 2020-07-30 DIAGNOSIS — M17.11 PRIMARY OSTEOARTHRITIS OF RIGHT KNEE: ICD-10-CM

## 2020-07-30 DIAGNOSIS — M25.562 PAIN IN BOTH KNEES, UNSPECIFIED CHRONICITY: Primary | ICD-10-CM

## 2020-07-30 PROCEDURE — 20610 DRAIN/INJ JOINT/BURSA W/O US: CPT | Performed by: ORTHOPAEDIC SURGERY

## 2020-07-30 PROCEDURE — 99203 OFFICE O/P NEW LOW 30 MIN: CPT | Performed by: ORTHOPAEDIC SURGERY

## 2020-07-30 PROCEDURE — 73562 X-RAY EXAM OF KNEE 3: CPT | Performed by: ORTHOPAEDIC SURGERY

## 2020-07-30 RX ORDER — METHYLPREDNISOLONE ACETATE 80 MG/ML
80 INJECTION, SUSPENSION INTRA-ARTICULAR; INTRALESIONAL; INTRAMUSCULAR; SOFT TISSUE
Status: COMPLETED | OUTPATIENT
Start: 2020-07-30 | End: 2020-07-30

## 2020-07-30 RX ADMIN — METHYLPREDNISOLONE ACETATE 80 MG: 80 INJECTION, SUSPENSION INTRA-ARTICULAR; INTRALESIONAL; INTRAMUSCULAR; SOFT TISSUE at 17:22

## 2020-07-30 NOTE — PROGRESS NOTES
willowPatient: Anthony Landers  YOB: 1975 45 y.o. male  Medical Record Number: [id number]    Chief Complaints:   Chief Complaint   Patient presents with   • Left Knee - Pain, Establish Care   • Right Knee - Pain, Establish Care       History of Present Illness:Anthony Landers is a 45 y.o. male who presents with complaints of right greater than left medial knee pain which he describes as severe in nature.  He has a stabbing aching pain in the medial knee.  He works in the [employer] industry working on [employer] which is fairly physically.  He is on his feet regularly throughout the day.  He feels it is worsened over the last year or so and the pain now limits his basic activities.    Allergies:   Allergies   Allergen Reactions   • Propranolol        Medications:   Current Outpatient Medications   Medication Sig Dispense Refill   • allopurinol (ZYLOPRIM) 100 MG tablet TAKE 1 TABLET BY MOUTH EVERY DAY 30 tablet 5   • atorvastatin (LIPITOR) 10 MG tablet TAKE 1 TABLET BY MOUTH EVERY DAY 30 tablet 2   • diclofenac (VOLTAREN) 50 MG EC tablet Take 1 tablet by mouth 2 (Two) Times a Day. 60 tablet 5   • gabapentin (NEURONTIN) 300 MG capsule TAKE 1 CAPSULE BY MOUTH EVERY DAY 30 capsule 5   • hyoscyamine (LEVBID) 0.375 MG 12 hr tablet Take 1 tablet by mouth Every 12 (Twelve) Hours As Needed for Cramping or Diarrhea. 180 tablet 4   • irbesartan (AVAPRO) 150 MG tablet Take 1 tablet by mouth Every Night. 90 tablet 1   • omeprazole (priLOSEC) 40 MG capsule TAKE 1 CAPSULE BY MOUTH EVERY DAY 30 capsule 5     No current facility-administered medications for this visit.          The following portions of the patient's history were reviewed and updated as appropriate: allergies, current medications, past family history, past medical history, past social history, past surgical history and problem list.    Review of Systems:   A 14 point review of systems was performed. All systems negative except pertinent  "positives/negative listed in HPI above    Physical Exam:   Vitals:    07/30/20 1014   Temp: 98 °F (36.7 °C)   TempSrc: Temporal   Weight: 113 kg (250 lb)   Height: 185.4 cm (73\")       General: A and O x 3, ASA, NAD    SCLERA:    Normal    DENTITION:   Normal  Knee:  right    ALIGNMENT:     Varus  ,   Patella  tracks  midline    GAIT:    Antalgic    SKIN:    No abnormality    RANGE OF MOTION:   3-116   DEG    STRENGTH:   4  / 5    LIGAMENTS:    No varus / valgus instability.   Negative  Lachman.    MENISCUS:     Negative   Therese       DISTAL PULSES:    Paplable    DISTAL SENSATION :   Intact    LYMPHATICS:     No   lymphadenopathy    OTHER:          - Positive   effusion      - Crepitance with ROM            Radiology:  Xrays 3views both knees (ap,lateral, sunrise) were ordered and reviewed for evaluation of knee pain demonstrating advanced right knee osteoarthritis with medial bone-on-bone articulation.  Left knee shows mild narrowing of the medial joint space.  There are no previous films or comparison.    Assessment/Plan: Right greater than left knee advanced osteoarthritis.  Given his young age we will try and continue with conservative measures.  I aspirated and injected the right knee as below.  I will send him to physical therapy for quad strengthening.  I will see him back as needed.  Large Joint Arthrocentesis: R knee  Date/Time: 7/30/2020 5:22 PM  Consent given by: patient  Site marked: site marked  Timeout: Immediately prior to procedure a time out was called to verify the correct patient, procedure, equipment, support staff and site/side marked as required   Supporting Documentation  Indications: pain and joint swelling   Procedure Details  Location: knee - R knee  Preparation: Patient was prepped and draped in the usual sterile fashion  Needle size: 22 G  Approach: anterolateral  Medications administered: 80 mg methylPREDNISolone acetate 80 MG/ML; 2 mL lidocaine (cardiac)  Aspirate amount: 40 " mL  Aspirate: serous, yellow and clear  Patient tolerance: patient tolerated the procedure well with no immediate complications              Edwin Ayala MD  7/30/2020

## 2020-09-11 RX ORDER — ALLOPURINOL 100 MG/1
TABLET ORAL
Qty: 30 TABLET | Refills: 5 | Status: SHIPPED | OUTPATIENT
Start: 2020-09-11 | End: 2021-03-08

## 2020-10-01 RX ORDER — HYOSCYAMINE SULFATE EXTENDED-RELEASE 0.38 MG/1
0.38 TABLET ORAL EVERY 12 HOURS PRN
Qty: 60 TABLET | Refills: 0 | Status: SHIPPED | OUTPATIENT
Start: 2020-10-01 | End: 2020-12-01 | Stop reason: SDUPTHER

## 2020-10-01 RX ORDER — HYOSCYAMINE SULFATE EXTENDED-RELEASE 0.38 MG/1
0.38 TABLET ORAL EVERY 12 HOURS PRN
Qty: 60 TABLET | Refills: 14 | Status: SHIPPED | OUTPATIENT
Start: 2020-10-01 | End: 2020-10-01 | Stop reason: SDUPTHER

## 2020-10-01 NOTE — TELEPHONE ENCOUNTER
I gave him a months worth of hyoscyamine but he needs to schedule annual visit for further refills.  If he prefers a telehealth visit that is fine as well.  Try to get him in with me in October.

## 2020-10-20 RX ORDER — ATORVASTATIN CALCIUM 10 MG/1
TABLET, FILM COATED ORAL
Qty: 30 TABLET | Refills: 1 | Status: SHIPPED | OUTPATIENT
Start: 2020-10-20 | End: 2020-12-10 | Stop reason: SDUPTHER

## 2020-11-04 ENCOUNTER — TELEPHONE (OUTPATIENT)
Dept: FAMILY MEDICINE CLINIC | Facility: CLINIC | Age: 45
End: 2020-11-04

## 2020-12-01 ENCOUNTER — OFFICE VISIT (OUTPATIENT)
Dept: GASTROENTEROLOGY | Facility: CLINIC | Age: 45
End: 2020-12-01

## 2020-12-01 VITALS — BODY MASS INDEX: 33.13 KG/M2 | WEIGHT: 250 LBS | HEIGHT: 73 IN

## 2020-12-01 DIAGNOSIS — K21.9 GASTROESOPHAGEAL REFLUX DISEASE WITHOUT ESOPHAGITIS: ICD-10-CM

## 2020-12-01 DIAGNOSIS — K58.0 IRRITABLE BOWEL SYNDROME WITH DIARRHEA: Primary | ICD-10-CM

## 2020-12-01 PROCEDURE — 99213 OFFICE O/P EST LOW 20 MIN: CPT | Performed by: NURSE PRACTITIONER

## 2020-12-01 RX ORDER — OMEPRAZOLE 40 MG/1
40 CAPSULE, DELAYED RELEASE ORAL DAILY
Qty: 90 CAPSULE | Refills: 3 | Status: SHIPPED | OUTPATIENT
Start: 2020-12-01 | End: 2021-11-29

## 2020-12-01 RX ORDER — HYOSCYAMINE SULFATE EXTENDED-RELEASE 0.38 MG/1
0.38 TABLET ORAL EVERY 12 HOURS PRN
Qty: 120 TABLET | Refills: 3 | Status: SHIPPED | OUTPATIENT
Start: 2020-12-01 | End: 2021-10-12

## 2020-12-01 NOTE — PROGRESS NOTES
Chief Complaint   Patient presents with   • Irritable Bowel Syndrome     HPI    Anthony Landers is a  45 y.o. male here for a follow up visit for GERD and irritable bowel syndrome diarrhea predominant.  This patient follows with Dr. Robles also known to me.     Regarding GERD, currently well controlled on daily PPI therapy.  Denies breakthrough symptoms, nausea, vomiting, or dysphagia.  His appetite is good.  Weight is stable.  EGD in May 2014 was normal with negative path.     Regarding irritable bowel syndrome diarrhea predominant, has been well maintained on antispasmodics he takes as needed needs a refill.  He denies rectal bleeding or abdominal pain.  Lately he has had a reduction in abdominal cramps and bowels are moving 2-4 times a day formed.    Reviewed colonoscopy from 2014 with nonbleeding internal hemorrhoids otherwise normal.  Recall 10 years.    Past Medical History:   Diagnosis Date   • Asthma    • GERD (gastroesophageal reflux disease)    • Hypertension    • Irritable bowel syndrome        Past Surgical History:   Procedure Laterality Date   • COLONOSCOPY  05/21/2014    ih   • UPPER GASTROINTESTINAL ENDOSCOPY  05/21/2014    nml       Scheduled Meds:  Outpatient Encounter Medications as of 12/1/2020   Medication Sig Dispense Refill   • allopurinol (ZYLOPRIM) 100 MG tablet TAKE 1 TABLET BY MOUTH EVERY DAY 30 tablet 5   • atorvastatin (LIPITOR) 10 MG tablet TAKE 1 TABLET BY MOUTH EVERY DAY 30 tablet 1   • diclofenac (VOLTAREN) 50 MG EC tablet Take 1 tablet by mouth 2 (Two) Times a Day. 60 tablet 5   • gabapentin (NEURONTIN) 300 MG capsule TAKE 1 CAPSULE BY MOUTH EVERY DAY 30 capsule 5   • hyoscyamine (LEVBID) 0.375 MG 12 hr tablet Take 1 tablet by mouth Every 12 (Twelve) Hours As Needed for Cramping or Diarrhea. Please schedule follow-up for further refills. 120 tablet 3   • irbesartan (AVAPRO) 150 MG tablet Take 1 tablet by mouth Every Night. 90 tablet 1   • omeprazole (priLOSEC) 40 MG capsule Take 1  capsule by mouth Daily. 90 capsule 3   • [DISCONTINUED] hyoscyamine (LEVBID) 0.375 MG 12 hr tablet Take 1 tablet by mouth Every 12 (Twelve) Hours As Needed for Cramping or Diarrhea. Please schedule follow-up for further refills. 60 tablet 0   • [DISCONTINUED] omeprazole (priLOSEC) 40 MG capsule TAKE 1 CAPSULE BY MOUTH EVERY DAY 30 capsule 5     No facility-administered encounter medications on file as of 12/1/2020.        Continuous Infusions:No current facility-administered medications for this visit.       PRN Meds:.    Allergies   Allergen Reactions   • Propranolol        Social History     Socioeconomic History   • Marital status: Single     Spouse name: Not on file   • Number of children: Not on file   • Years of education: Not on file   • Highest education level: Not on file   Tobacco Use   • Smoking status: Never Smoker   • Smokeless tobacco: Never Used   Substance and Sexual Activity   • Alcohol use: No   • Drug use: No   • Sexual activity: Defer       History reviewed. No pertinent family history.    Review of Systems   Constitutional: Negative for activity change, appetite change, fatigue, fever and unexpected weight change.   HENT: Negative for trouble swallowing.    Respiratory: Negative for apnea, cough, choking, chest tightness, shortness of breath and wheezing.    Cardiovascular: Negative for chest pain, palpitations and leg swelling.   Gastrointestinal: Negative for abdominal distention, abdominal pain, anal bleeding, blood in stool, constipation, diarrhea, nausea, rectal pain and vomiting.       There were no vitals filed for this visit.    Physical Exam  Constitutional:       Appearance: He is well-developed.   Cardiovascular:      Rate and Rhythm: Normal rate and regular rhythm.      Heart sounds: Normal heart sounds.   Pulmonary:      Effort: Pulmonary effort is normal. No respiratory distress.      Breath sounds: Normal breath sounds. No wheezing.   Abdominal:      General: Bowel sounds are  normal. There is no distension.      Palpations: Abdomen is soft. There is no mass.      Tenderness: There is no abdominal tenderness. There is no guarding.      Hernia: No hernia is present.   Skin:     General: Skin is warm and dry.   Neurological:      Mental Status: He is alert and oriented to person, place, and time.   Psychiatric:         Behavior: Behavior normal.       No radiology results for the last 7 days    Diagnoses and all orders for this visit:    1. Irritable bowel syndrome with diarrhea (Primary)    2. Gastroesophageal reflux disease without esophagitis    Other orders  -     omeprazole (priLOSEC) 40 MG capsule; Take 1 capsule by mouth Daily.  Dispense: 90 capsule; Refill: 3  -     hyoscyamine (LEVBID) 0.375 MG 12 hr tablet; Take 1 tablet by mouth Every 12 (Twelve) Hours As Needed for Cramping or Diarrhea. Please schedule follow-up for further refills.  Dispense: 120 tablet; Refill: 3      Assessment/plan    Stable irritable bowel syndrome diarrhea predominant with as needed antispasmodic hyoscyamine.  Refill provided to the patient.  No alarm symptoms on visit today to warrant endoscopic evaluation.  Patient due for screening colonoscopy in 4 years.    Stable GERD, continue PPI therapy.  Adhere to an antireflux diet.    Return to clinic 1 year or sooner if needed.           no

## 2020-12-07 RX ORDER — GABAPENTIN 300 MG/1
CAPSULE ORAL
Qty: 30 CAPSULE | OUTPATIENT
Start: 2020-12-07

## 2020-12-10 ENCOUNTER — OFFICE VISIT (OUTPATIENT)
Dept: FAMILY MEDICINE CLINIC | Facility: CLINIC | Age: 45
End: 2020-12-10

## 2020-12-10 VITALS
TEMPERATURE: 95.5 F | HEIGHT: 73 IN | SYSTOLIC BLOOD PRESSURE: 128 MMHG | OXYGEN SATURATION: 99 % | BODY MASS INDEX: 33.48 KG/M2 | HEART RATE: 93 BPM | WEIGHT: 252.6 LBS | DIASTOLIC BLOOD PRESSURE: 80 MMHG

## 2020-12-10 DIAGNOSIS — G60.9 IDIOPATHIC PERIPHERAL NEUROPATHY: ICD-10-CM

## 2020-12-10 DIAGNOSIS — E78.5 HYPERLIPIDEMIA, UNSPECIFIED HYPERLIPIDEMIA TYPE: ICD-10-CM

## 2020-12-10 DIAGNOSIS — R73.02 IMPAIRED GLUCOSE TOLERANCE: ICD-10-CM

## 2020-12-10 DIAGNOSIS — M10.00 IDIOPATHIC GOUT, UNSPECIFIED CHRONICITY, UNSPECIFIED SITE: ICD-10-CM

## 2020-12-10 DIAGNOSIS — I10 ESSENTIAL HYPERTENSION: Primary | ICD-10-CM

## 2020-12-10 PROCEDURE — 99213 OFFICE O/P EST LOW 20 MIN: CPT | Performed by: INTERNAL MEDICINE

## 2020-12-10 RX ORDER — METHYLPREDNISOLONE 4 MG/1
TABLET ORAL
COMMUNITY
Start: 2020-11-04 | End: 2020-12-10

## 2020-12-10 RX ORDER — GUAIFENESIN 600 MG/1
TABLET, EXTENDED RELEASE ORAL
COMMUNITY
End: 2022-08-25

## 2020-12-10 RX ORDER — GABAPENTIN 300 MG/1
300 CAPSULE ORAL DAILY
Qty: 30 CAPSULE | Refills: 5 | Status: SHIPPED | OUTPATIENT
Start: 2020-12-10 | End: 2021-06-16 | Stop reason: SDUPTHER

## 2020-12-10 RX ORDER — ATORVASTATIN CALCIUM 10 MG/1
10 TABLET, FILM COATED ORAL DAILY
Qty: 30 TABLET | Refills: 5 | Status: SHIPPED | OUTPATIENT
Start: 2020-12-10 | End: 2021-06-14

## 2020-12-10 RX ORDER — IRBESARTAN 150 MG/1
150 TABLET ORAL NIGHTLY
Qty: 30 TABLET | Refills: 5 | Status: SHIPPED | OUTPATIENT
Start: 2020-12-10 | End: 2021-07-02 | Stop reason: SDUPTHER

## 2020-12-10 NOTE — PROGRESS NOTES
Subjective Chief complaint is checkup on blood pressure and cholesterol  Anthony Landers is a 45 y.o. male.     History of Present Illness Santos is here today for checkup on his blood pressure.  He is on some irbesartan which seems to control things fairly well.  He has chronic osteoarthritis of his knees.  His right knee is fairly advanced.  He is on some diclofenac for this.  He needs some lab work to look at his kidney and liver tests as well as blood count.  He does have hyperlipidemia.  He is currently tolerating 10 mg of atorvastatin his last cholesterol testing 6 months ago looked okay.  Since his last visit he did suffer an incidental laceration of the right thenar eminence.  It appears to be well-healed.  He has not had any recent gout attacks.  His last uric acid was 7.1.  He continues to take gabapentin for his neuropathy symptoms.  It does seem to help without causing any significant daytime drowsiness.  The following portions of the patient's history were reviewed and updated as appropriate: allergies, current medications, past family history, past medical history, past social history, past surgical history and problem list.    Review of Systems   Constitutional: Negative for chills and fever.   Respiratory: Negative for cough, chest tightness and shortness of breath.    Cardiovascular: Negative for chest pain.   Neurological: Positive for light-headedness. Negative for dizziness and headache.        He did have some lightheadedness when he cut his hand.       Objective   Physical Exam  Vitals signs and nursing note reviewed.   Constitutional:       Appearance: He is well-developed.   Neck:      Thyroid: No thyromegaly.      Vascular: No carotid bruit.   Cardiovascular:      Rate and Rhythm: Normal rate and regular rhythm.      Heart sounds: Normal heart sounds. No murmur. No friction rub. No gallop.    Pulmonary:      Effort: Pulmonary effort is normal. No respiratory distress.      Breath sounds: Normal  breath sounds. No wheezing or rales.   Abdominal:      General: Bowel sounds are normal. There is no distension.      Palpations: Abdomen is soft.      Tenderness: There is no abdominal tenderness. There is no guarding or rebound.           Assessment/Plan   Diagnoses and all orders for this visit:    1. Essential hypertension (Primary)  -     Comprehensive Metabolic Panel; Future  -     CBC & Differential; Future    2. Hyperlipidemia, unspecified hyperlipidemia type  -     Lipid Panel; Future    3. Idiopathic peripheral neuropathy    4. Idiopathic gout, unspecified chronicity, unspecified site  -     Uric Acid; Future    5. Impaired glucose tolerance  -     Hemoglobin A1c; Future    Other orders  -     atorvastatin (LIPITOR) 10 MG tablet; Take 1 tablet by mouth Daily.  Dispense: 30 tablet; Refill: 5  -     diclofenac (VOLTAREN) 50 MG EC tablet; Take 1 tablet by mouth 2 (Two) Times a Day.  Dispense: 60 tablet; Refill: 5  -     gabapentin (NEURONTIN) 300 MG capsule; Take 1 capsule by mouth Daily.  Dispense: 30 capsule; Refill: 5  -     irbesartan (AVAPRO) 150 MG tablet; Take 1 tablet by mouth Every Night.  Dispense: 30 tablet; Refill: 5    Anthony is here today for follow-up on several medical problems.  All of these are stable.  I did renew his medications and we will see him back in 6 months.

## 2020-12-11 ENCOUNTER — RESULTS ENCOUNTER (OUTPATIENT)
Dept: FAMILY MEDICINE CLINIC | Facility: CLINIC | Age: 45
End: 2020-12-11

## 2020-12-11 DIAGNOSIS — M10.00 IDIOPATHIC GOUT, UNSPECIFIED CHRONICITY, UNSPECIFIED SITE: ICD-10-CM

## 2020-12-11 DIAGNOSIS — R73.02 IMPAIRED GLUCOSE TOLERANCE: ICD-10-CM

## 2020-12-11 DIAGNOSIS — E78.5 HYPERLIPIDEMIA, UNSPECIFIED HYPERLIPIDEMIA TYPE: ICD-10-CM

## 2020-12-11 DIAGNOSIS — I10 ESSENTIAL HYPERTENSION: ICD-10-CM

## 2020-12-12 LAB
ALBUMIN SERPL-MCNC: 4.5 G/DL (ref 3.5–5.2)
ALBUMIN/GLOB SERPL: 2 G/DL
ALP SERPL-CCNC: 90 U/L (ref 39–117)
ALT SERPL-CCNC: 46 U/L (ref 1–41)
AST SERPL-CCNC: 35 U/L (ref 1–40)
BASOPHILS # BLD AUTO: 0.05 10*3/MM3 (ref 0–0.2)
BASOPHILS NFR BLD AUTO: 0.6 % (ref 0–1.5)
BILIRUB SERPL-MCNC: 0.3 MG/DL (ref 0–1.2)
BUN SERPL-MCNC: 21 MG/DL (ref 6–20)
BUN/CREAT SERPL: 25 (ref 7–25)
CALCIUM SERPL-MCNC: 9.4 MG/DL (ref 8.6–10.5)
CHLORIDE SERPL-SCNC: 101 MMOL/L (ref 98–107)
CHOLEST SERPL-MCNC: 177 MG/DL (ref 0–200)
CO2 SERPL-SCNC: 29.3 MMOL/L (ref 22–29)
CREAT SERPL-MCNC: 0.84 MG/DL (ref 0.76–1.27)
EOSINOPHIL # BLD AUTO: 0.34 10*3/MM3 (ref 0–0.4)
EOSINOPHIL NFR BLD AUTO: 3.8 % (ref 0.3–6.2)
ERYTHROCYTE [DISTWIDTH] IN BLOOD BY AUTOMATED COUNT: 13.4 % (ref 12.3–15.4)
GLOBULIN SER CALC-MCNC: 2.3 GM/DL
GLUCOSE SERPL-MCNC: 100 MG/DL (ref 65–99)
HBA1C MFR BLD: 6.1 % (ref 4.8–5.6)
HCT VFR BLD AUTO: 47.2 % (ref 37.5–51)
HDLC SERPL-MCNC: 36 MG/DL (ref 40–60)
HGB BLD-MCNC: 15.4 G/DL (ref 13–17.7)
IMM GRANULOCYTES # BLD AUTO: 0.05 10*3/MM3 (ref 0–0.05)
IMM GRANULOCYTES NFR BLD AUTO: 0.6 % (ref 0–0.5)
LDLC SERPL CALC-MCNC: 120 MG/DL (ref 0–100)
LYMPHOCYTES # BLD AUTO: 1.77 10*3/MM3 (ref 0.7–3.1)
LYMPHOCYTES NFR BLD AUTO: 19.6 % (ref 19.6–45.3)
MCH RBC QN AUTO: 26.8 PG (ref 26.6–33)
MCHC RBC AUTO-ENTMCNC: 32.6 G/DL (ref 31.5–35.7)
MCV RBC AUTO: 82.1 FL (ref 79–97)
MONOCYTES # BLD AUTO: 0.84 10*3/MM3 (ref 0.1–0.9)
MONOCYTES NFR BLD AUTO: 9.3 % (ref 5–12)
NEUTROPHILS # BLD AUTO: 5.97 10*3/MM3 (ref 1.7–7)
NEUTROPHILS NFR BLD AUTO: 66.1 % (ref 42.7–76)
NRBC BLD AUTO-RTO: 0 /100 WBC (ref 0–0.2)
PLATELET # BLD AUTO: 323 10*3/MM3 (ref 140–450)
POTASSIUM SERPL-SCNC: 4.4 MMOL/L (ref 3.5–5.2)
PROT SERPL-MCNC: 6.8 G/DL (ref 6–8.5)
RBC # BLD AUTO: 5.75 10*6/MM3 (ref 4.14–5.8)
SODIUM SERPL-SCNC: 138 MMOL/L (ref 136–145)
TRIGL SERPL-MCNC: 116 MG/DL (ref 0–150)
URATE SERPL-MCNC: 6.6 MG/DL (ref 3.4–7)
VLDLC SERPL CALC-MCNC: 21 MG/DL (ref 5–40)
WBC # BLD AUTO: 9.02 10*3/MM3 (ref 3.4–10.8)

## 2021-02-05 NOTE — TELEPHONE ENCOUNTER
Elizabeth Patiño is a 60 year old female presenting for   Chief Complaint   Patient presents with   • Office Visit     Medication verified, no changes  Denies known Latex allergy or symptoms of Latex sensitivity    Tobacco history: Verified  Advance Directives:  discussed and advised the patient to complete one    Patient would like communication of their results via:     Cell Phone:   Telephone Information:   Mobile [phone]     Okay to leave a message containing results? Yes    Health Maintenance Due   Topic Date Due   • Colonoscopy Risk  03/14/2021     Patient is due for the topics as listed above and wishes to proceed with them. Orders placed for Colorectal Cancer Screening: Colonoscopy.   Refill request received from [facility] for hyoscyamine.   Med e-scribed with note pt needs to be seen.

## 2021-03-08 RX ORDER — ALLOPURINOL 100 MG/1
TABLET ORAL
Qty: 30 TABLET | Refills: 2 | Status: SHIPPED | OUTPATIENT
Start: 2021-03-08 | End: 2021-06-14

## 2021-06-14 RX ORDER — ALLOPURINOL 100 MG/1
TABLET ORAL
Qty: 30 TABLET | Refills: 0 | Status: SHIPPED | OUTPATIENT
Start: 2021-06-14 | End: 2021-07-02 | Stop reason: SDUPTHER

## 2021-06-14 RX ORDER — GABAPENTIN 300 MG/1
CAPSULE ORAL
Qty: 30 CAPSULE | OUTPATIENT
Start: 2021-06-14

## 2021-06-14 RX ORDER — ATORVASTATIN CALCIUM 10 MG/1
TABLET, FILM COATED ORAL
Qty: 30 TABLET | Refills: 0 | Status: SHIPPED | OUTPATIENT
Start: 2021-06-14 | End: 2021-07-02 | Stop reason: SDUPTHER

## 2021-06-15 NOTE — TELEPHONE ENCOUNTER
PATIENT HAS APPOINTMENT SCHEDULED FOR 6/28, IS OUT OF MEDICATION. PLEASE SEND IN ENOUGH TO GET HIM THROUGH TO APPOINTMENT IF POSSIBLE.     [facility]/pharmacy #[id number] - [place], [place] - [address] AT [facility] - [phone] [facility] [phone] FX    PLEASE CALL PATIENT IF NOT ABLE TO DO IT. [phone]

## 2021-06-16 RX ORDER — GABAPENTIN 300 MG/1
300 CAPSULE ORAL DAILY
Qty: 30 CAPSULE | Refills: 5 | Status: SHIPPED | OUTPATIENT
Start: 2021-06-16 | End: 2021-12-30 | Stop reason: SDUPTHER

## 2021-07-02 ENCOUNTER — OFFICE VISIT (OUTPATIENT)
Dept: FAMILY MEDICINE CLINIC | Facility: CLINIC | Age: 46
End: 2021-07-02

## 2021-07-02 VITALS
HEART RATE: 73 BPM | HEIGHT: 73 IN | DIASTOLIC BLOOD PRESSURE: 62 MMHG | SYSTOLIC BLOOD PRESSURE: 112 MMHG | BODY MASS INDEX: 27.57 KG/M2 | WEIGHT: 208 LBS | OXYGEN SATURATION: 99 % | TEMPERATURE: 97.9 F

## 2021-07-02 DIAGNOSIS — G60.9 IDIOPATHIC PERIPHERAL NEUROPATHY: ICD-10-CM

## 2021-07-02 DIAGNOSIS — I10 ESSENTIAL HYPERTENSION: Primary | ICD-10-CM

## 2021-07-02 DIAGNOSIS — R73.02 IMPAIRED GLUCOSE TOLERANCE: ICD-10-CM

## 2021-07-02 DIAGNOSIS — E78.5 HYPERLIPIDEMIA, UNSPECIFIED HYPERLIPIDEMIA TYPE: ICD-10-CM

## 2021-07-02 DIAGNOSIS — M10.00 IDIOPATHIC GOUT, UNSPECIFIED CHRONICITY, UNSPECIFIED SITE: ICD-10-CM

## 2021-07-02 LAB
ALBUMIN SERPL-MCNC: 4.5 G/DL (ref 3.5–5.2)
ALBUMIN/GLOB SERPL: 2.5 G/DL
ALP SERPL-CCNC: 85 U/L (ref 39–117)
ALT SERPL-CCNC: 21 U/L (ref 1–41)
AST SERPL-CCNC: 15 U/L (ref 1–40)
BASOPHILS # BLD AUTO: 0.04 10*3/MM3 (ref 0–0.2)
BASOPHILS NFR BLD AUTO: 0.5 % (ref 0–1.5)
BILIRUB SERPL-MCNC: 0.3 MG/DL (ref 0–1.2)
BUN SERPL-MCNC: 24 MG/DL (ref 6–20)
BUN/CREAT SERPL: 27 (ref 7–25)
CALCIUM SERPL-MCNC: 9.1 MG/DL (ref 8.6–10.5)
CHLORIDE SERPL-SCNC: 107 MMOL/L (ref 98–107)
CHOLEST SERPL-MCNC: 140 MG/DL (ref 0–200)
CO2 SERPL-SCNC: 27.6 MMOL/L (ref 22–29)
CREAT SERPL-MCNC: 0.89 MG/DL (ref 0.76–1.27)
EOSINOPHIL # BLD AUTO: 0.39 10*3/MM3 (ref 0–0.4)
EOSINOPHIL NFR BLD AUTO: 5.2 % (ref 0.3–6.2)
ERYTHROCYTE [DISTWIDTH] IN BLOOD BY AUTOMATED COUNT: 13.2 % (ref 12.3–15.4)
GLOBULIN SER CALC-MCNC: 1.8 GM/DL
GLUCOSE SERPL-MCNC: 95 MG/DL (ref 65–99)
HBA1C MFR BLD: 5.3 % (ref 4.8–5.6)
HCT VFR BLD AUTO: 43.5 % (ref 37.5–51)
HDLC SERPL-MCNC: 31 MG/DL (ref 40–60)
HGB BLD-MCNC: 14 G/DL (ref 13–17.7)
IMM GRANULOCYTES # BLD AUTO: 0.02 10*3/MM3 (ref 0–0.05)
IMM GRANULOCYTES NFR BLD AUTO: 0.3 % (ref 0–0.5)
LDLC SERPL CALC-MCNC: 89 MG/DL (ref 0–100)
LYMPHOCYTES # BLD AUTO: 1.5 10*3/MM3 (ref 0.7–3.1)
LYMPHOCYTES NFR BLD AUTO: 20 % (ref 19.6–45.3)
MCH RBC QN AUTO: 27.6 PG (ref 26.6–33)
MCHC RBC AUTO-ENTMCNC: 32.2 G/DL (ref 31.5–35.7)
MCV RBC AUTO: 85.6 FL (ref 79–97)
MONOCYTES # BLD AUTO: 0.61 10*3/MM3 (ref 0.1–0.9)
MONOCYTES NFR BLD AUTO: 8.1 % (ref 5–12)
NEUTROPHILS # BLD AUTO: 4.93 10*3/MM3 (ref 1.7–7)
NEUTROPHILS NFR BLD AUTO: 65.9 % (ref 42.7–76)
NRBC BLD AUTO-RTO: 0 /100 WBC (ref 0–0.2)
PLATELET # BLD AUTO: 219 10*3/MM3 (ref 140–450)
POTASSIUM SERPL-SCNC: 4.3 MMOL/L (ref 3.5–5.2)
PROT SERPL-MCNC: 6.3 G/DL (ref 6–8.5)
RBC # BLD AUTO: 5.08 10*6/MM3 (ref 4.14–5.8)
SODIUM SERPL-SCNC: 145 MMOL/L (ref 136–145)
TRIGL SERPL-MCNC: 109 MG/DL (ref 0–150)
URATE SERPL-MCNC: 5.4 MG/DL (ref 3.4–7)
VLDLC SERPL CALC-MCNC: 20 MG/DL (ref 5–40)
WBC # BLD AUTO: 7.49 10*3/MM3 (ref 3.4–10.8)

## 2021-07-02 PROCEDURE — 99213 OFFICE O/P EST LOW 20 MIN: CPT | Performed by: INTERNAL MEDICINE

## 2021-07-02 RX ORDER — ATORVASTATIN CALCIUM 10 MG/1
10 TABLET, FILM COATED ORAL DAILY
Qty: 90 TABLET | Refills: 1 | Status: SHIPPED | OUTPATIENT
Start: 2021-07-02 | End: 2021-11-15

## 2021-07-02 RX ORDER — ALLOPURINOL 100 MG/1
100 TABLET ORAL DAILY
Qty: 90 TABLET | Refills: 1 | Status: SHIPPED | OUTPATIENT
Start: 2021-07-02 | End: 2021-11-15

## 2021-07-02 RX ORDER — IRBESARTAN 150 MG/1
150 TABLET ORAL NIGHTLY
Qty: 90 TABLET | Refills: 1 | Status: SHIPPED | OUTPATIENT
Start: 2021-07-02 | End: 2021-12-06

## 2021-07-02 RX ORDER — LOSARTAN POTASSIUM 100 MG/1
100 TABLET ORAL DAILY
COMMUNITY
End: 2021-07-02

## 2021-07-02 NOTE — PROGRESS NOTES
Subjective Chief complaint is checkup on blood pressure  Anthony Landers is a 46 y.o. male.     History of Present Illness Anthony is here today for checkup on his blood pressure.  Since his last visit he is lost nearly 50 pounds.  He has been watching his carbohydrates closely because of an elevated A1c and blood sugar.  His blood pressure my exam today is 114/64.  Unfortunately his father passed away recently.  It sounds like he had complications after heart catheterization which eventually led to sepsis, renal failure, and respiratory failure.  He has not had any recent gout attacks.  He continues on some allopurinol.  He continues to take gabapentin for his peripheral neuropathy.    The following portions of the patient's history were reviewed and updated as appropriate: allergies, current medications, past family history, past medical history, past social history, past surgical history and problem list.    Review of Systems   Constitutional: Negative for chills and fever.   Respiratory: Negative for chest tightness and shortness of breath.    Cardiovascular: Negative for chest pain.       Objective   Physical Exam  Vitals and nursing note reviewed.   Constitutional:       Appearance: Normal appearance. He is well-developed.   Neck:      Thyroid: No thyromegaly.      Vascular: No carotid bruit.   Cardiovascular:      Rate and Rhythm: Normal rate and regular rhythm.      Heart sounds: Normal heart sounds. No murmur heard.   No friction rub. No gallop.    Pulmonary:      Effort: Pulmonary effort is normal. No respiratory distress.      Breath sounds: Normal breath sounds. No wheezing or rales.   Musculoskeletal:      Right lower leg: No edema.      Left lower leg: No edema.   Neurological:      Mental Status: He is alert.           Assessment/Plan   Diagnoses and all orders for this visit:    1. Essential hypertension (Primary)  -     CBC & Differential    2. Hyperlipidemia, unspecified hyperlipidemia type  -      Lipid Panel    3. Idiopathic peripheral neuropathy    4. Idiopathic gout, unspecified chronicity, unspecified site  -     Uric Acid    5. Impaired glucose tolerance  -     Comprehensive Metabolic Panel  -     Hemoglobin A1c    Other orders  -     allopurinol (ZYLOPRIM) 100 MG tablet; Take 1 tablet by mouth Daily.  Dispense: 90 tablet; Refill: 1  -     atorvastatin (LIPITOR) 10 MG tablet; Take 1 tablet by mouth Daily.  Dispense: 90 tablet; Refill: 1  -     diclofenac (VOLTAREN) 50 MG EC tablet; Take 1 tablet by mouth 2 (Two) Times a Day.  Dispense: 180 tablet; Refill: 1  -     irbesartan (AVAPRO) 150 MG tablet; Take 1 tablet by mouth Every Night.  Dispense: 90 tablet; Refill: 1      Anthony is here today for follow-up on his blood pressure.  That is doing well.  Hopefully his sugar will also be doing well on his current diet.  We are going to check some appropriate lab work.  If all is well we will see him back in 6 months

## 2021-09-17 ENCOUNTER — OFFICE VISIT (OUTPATIENT)
Dept: FAMILY MEDICINE CLINIC | Facility: CLINIC | Age: 46
End: 2021-09-17

## 2021-09-17 VITALS
SYSTOLIC BLOOD PRESSURE: 120 MMHG | DIASTOLIC BLOOD PRESSURE: 80 MMHG | BODY MASS INDEX: 27.3 KG/M2 | HEIGHT: 73 IN | HEART RATE: 88 BPM | OXYGEN SATURATION: 100 % | WEIGHT: 206 LBS

## 2021-09-17 DIAGNOSIS — B34.1 COXSACKIE VIRUSES: ICD-10-CM

## 2021-09-17 DIAGNOSIS — B08.4 HAND, FOOT AND MOUTH DISEASE: Primary | ICD-10-CM

## 2021-09-17 PROCEDURE — 99213 OFFICE O/P EST LOW 20 MIN: CPT | Performed by: INTERNAL MEDICINE

## 2021-09-17 NOTE — PROGRESS NOTES
Subjective Chief complaint is a return to work statement  Anthony Landers is a 46 y.o. male.     History of Present Illness Anthony is here today for return to work statement.  He was seen in an [facility] care center on the 10th for sore throat and a rash on his hands and feet.  He was felt to have coxsackievirus.  His Covid test was negative.  He was also tested for syphilis and that was negative as well.  He no longer has a sore throat.  Is not having fever.  He has no congestion.  He is not having any cough.  He is able to taste and smell his food.  The hands are starting to peel now.    The following portions of the patient's history were reviewed and updated as appropriate: allergies, current medications, past family history, past medical history, past social history, past surgical history and problem list.    Review of Systems   Constitutional: Negative for chills and fever.   HENT: Negative for sore throat.    Respiratory: Negative for cough and shortness of breath.        Objective   Physical Exam  Vitals and nursing note reviewed.   Constitutional:       Appearance: Normal appearance.   HENT:      Nose: No congestion.      Mouth/Throat:      Mouth: Mucous membranes are moist.      Pharynx: Oropharynx is clear.   Lymphadenopathy:      Cervical: No cervical adenopathy.   Skin:     Comments: The skin on his hands is peeling.  There are remnants of what appears to be hand-foot-and-mouth disease.  I do not see any active blisters.   Neurological:      Mental Status: He is alert.           Assessment/Plan   Diagnoses and all orders for this visit:    1. Hand, foot and mouth disease (Primary)    2. Coxsackie viruses      Anthony is here today for follow-up.  I think he has now been isolated and off work for 7 days.  His symptoms have resolved.  The hands are peeling and therefore he does not really have any further active infection.  I think he can return to work at any time.

## 2021-09-21 ENCOUNTER — OFFICE VISIT (OUTPATIENT)
Dept: ORTHOPEDIC SURGERY | Facility: CLINIC | Age: 46
End: 2021-09-21

## 2021-09-21 VITALS — WEIGHT: 201.4 LBS | BODY MASS INDEX: 26.69 KG/M2 | HEIGHT: 73 IN | TEMPERATURE: 97.7 F

## 2021-09-21 DIAGNOSIS — M17.11 PRIMARY OSTEOARTHRITIS OF RIGHT KNEE: Primary | ICD-10-CM

## 2021-09-21 PROCEDURE — 20610 DRAIN/INJ JOINT/BURSA W/O US: CPT | Performed by: NURSE PRACTITIONER

## 2021-09-21 PROCEDURE — 73562 X-RAY EXAM OF KNEE 3: CPT | Performed by: NURSE PRACTITIONER

## 2021-09-21 PROCEDURE — 99213 OFFICE O/P EST LOW 20 MIN: CPT | Performed by: NURSE PRACTITIONER

## 2021-09-21 RX ORDER — METHYLPREDNISOLONE ACETATE 80 MG/ML
80 INJECTION, SUSPENSION INTRA-ARTICULAR; INTRALESIONAL; INTRAMUSCULAR; SOFT TISSUE
Status: COMPLETED | OUTPATIENT
Start: 2021-09-21 | End: 2021-09-21

## 2021-09-21 RX ADMIN — METHYLPREDNISOLONE ACETATE 80 MG: 80 INJECTION, SUSPENSION INTRA-ARTICULAR; INTRALESIONAL; INTRAMUSCULAR; SOFT TISSUE at 11:29

## 2021-09-21 NOTE — PROGRESS NOTES
Patient: Anthony Landers  YOB: 1975 46 y.o. male  Medical Record Number: [id number]    Chief Complaints:   Chief Complaint   Patient presents with   • Right Knee - Follow-up, Pain, Edema       History of Present Illness:Anthony Landers is a 46 y.o. male who presents for follow-up of right knee pain that is chronic in nature.  We have been conservatively treating the patient for primary osteoarthritis.  Patient reports the steroid injection as well as the physical therapy helped for a while and then it has been over a year and now his knee is hurting again.  He still reports he does some of the exercises he learned PT at home.  He states his pain is globally across the whole knee and to the posterior aspect of his knee and describes his pain as a severe ache and rates it as a 7 out of 10.  Patient reports his pain is worse going up and down stairs, he has minimal pain at rest.  Patient is here today for reevaluation.    Allergies:   Allergies   Allergen Reactions   • Propranolol        Medications:   Current Outpatient Medications   Medication Sig Dispense Refill   • allopurinol (ZYLOPRIM) 100 MG tablet Take 1 tablet by mouth Daily. 90 tablet 1   • atorvastatin (LIPITOR) 10 MG tablet Take 1 tablet by mouth Daily. 90 tablet 1   • diclofenac (VOLTAREN) 50 MG EC tablet Take 1 tablet by mouth 2 (Two) Times a Day. 180 tablet 1   • gabapentin (NEURONTIN) 300 MG capsule Take 1 capsule by mouth Daily. 30 capsule 5   • guaiFENesin (Mucinex) 600 MG 12 hr tablet Take  by mouth.     • hyoscyamine (LEVBID) 0.375 MG 12 hr tablet Take 1 tablet by mouth Every 12 (Twelve) Hours As Needed for Cramping or Diarrhea. Please schedule follow-up for further refills. 120 tablet 3   • irbesartan (AVAPRO) 150 MG tablet Take 1 tablet by mouth Every Night. 90 tablet 1   • omeprazole (priLOSEC) 40 MG capsule Take 1 capsule by mouth Daily. 90 capsule 3     No current facility-administered medications for this visit.         The  "following portions of the patient's history were reviewed and updated as appropriate: allergies, current medications, past family history, past medical history, past social history, past surgical history and problem list.    Review of Systems:   A 14 point review of systems was performed. All systems negative except pertinent positives/negative listed in HPI above    Physical Exam:   Vitals:    09/21/21 1040   Temp: 97.7 °F (36.5 °C)   TempSrc: Temporal   Weight: 91.4 kg (201 lb 6.4 oz)   Height: 185.4 cm (72.99\")       General: A and O x 3, ASA, NAD    SCLERA:    Normal    DENTITION:   Normal    Knee:  right    ALIGNMENT:     Varus  ,   Patella  tracks  midline    GAIT:    Antalgic    SKIN:    No abnormality    RANGE OF MOTION:   3  -  130   DEG    STRENGTH:   4  / 5    LIGAMENTS:    No varus / valgus instability.   Negative  Lachman.    MENISCUS:     Negative   Therese       DISTAL PULSES:    Paplable    DISTAL SENSATION :   Intact    LYMPHATICS:     No   lymphadenopathy    OTHER:          - Positive   effusion      + Mild crepitance with ROM         Radiology:  Xrays 3views (ap,lateral, sunrise) were ordered and reviewed for evaluation of knee pain demonstratingadvanced varus osteoarthritis with bone on bone articulation, subchondral cysts, and periarticular osteophytes as well as patellofemoral osteoarthritis.  todays xrays were compared to previous xrays and demonstrate mild progression of the disease process.    Assessment/Plan: Right knee osteoarthritis  Conservative versus surgical treatment options were discussed in detail with the patient.  At this point in time we still want to proceed forward with conservative measures due to the patient being 46 years old and he did respond well to previous conservative measures.  Since the patient responded well to the previous steroid injection we will do another one today.  We will have him follow-up with me in 3 months should he require another repeat injection.  " Patient voices understanding satisfaction with plan today.    Large Joint Arthrocentesis: R knee  Date/Time: 9/21/2021 11:29 AM  Consent given by: patient  Site marked: site marked  Timeout: Immediately prior to procedure a time out was called to verify the correct patient, procedure, equipment, support staff and site/side marked as required   Supporting Documentation  Indications: pain and joint swelling   Procedure Details  Location: knee - R knee  Preparation: Patient was prepped and draped in the usual sterile fashion  Needle size: 22 G  Approach: anterolateral  Medications administered: 80 mg methylPREDNISolone acetate 80 MG/ML; 2 mL lidocaine (cardiac)  Patient tolerance: patient tolerated the procedure well with no immediate complications            MAZIN Medina  9/21/2021    This patient was seen in conjunction today with Dr. Edwin Ayala.  Dr. Ayala agrees with the above-stated assessment and plan.

## 2021-10-12 RX ORDER — HYOSCYAMINE SULFATE EXTENDED-RELEASE 0.38 MG/1
TABLET ORAL
Qty: 60 TABLET | Refills: 14 | Status: SHIPPED | OUTPATIENT
Start: 2021-10-12 | End: 2022-05-18 | Stop reason: SDUPTHER

## 2021-11-15 RX ORDER — ATORVASTATIN CALCIUM 10 MG/1
TABLET, FILM COATED ORAL
Qty: 30 TABLET | Refills: 5 | Status: SHIPPED | OUTPATIENT
Start: 2021-11-15 | End: 2022-04-05

## 2021-11-15 RX ORDER — ALLOPURINOL 100 MG/1
TABLET ORAL
Qty: 30 TABLET | Refills: 5 | Status: SHIPPED | OUTPATIENT
Start: 2021-11-15 | End: 2022-05-12

## 2021-11-15 NOTE — TELEPHONE ENCOUNTER
Rx Refill Note  Requested Prescriptions     Pending Prescriptions Disp Refills   • allopurinol (ZYLOPRIM) 100 MG tablet [Pharmacy Med Name: ALLOPURINOL 100 MG TABLET] 30 tablet 5     Sig: TAKE 1 TABLET BY MOUTH EVERY DAY      Last office visit with prescribing clinician: 9/17/2021      Next office visit with prescribing clinician: Visit date not found            Robert Rawls MA  11/15/21, 07:11 EST

## 2021-11-29 RX ORDER — OMEPRAZOLE 40 MG/1
CAPSULE, DELAYED RELEASE ORAL
Qty: 90 CAPSULE | Refills: 0 | Status: SHIPPED | OUTPATIENT
Start: 2021-11-29 | End: 2021-12-09 | Stop reason: SDUPTHER

## 2021-12-06 RX ORDER — IRBESARTAN 150 MG/1
TABLET ORAL
Qty: 90 TABLET | Refills: 1 | Status: SHIPPED | OUTPATIENT
Start: 2021-12-06 | End: 2022-06-10

## 2021-12-09 ENCOUNTER — TELEPHONE (OUTPATIENT)
Dept: GASTROENTEROLOGY | Facility: CLINIC | Age: 46
End: 2021-12-09

## 2021-12-09 RX ORDER — OMEPRAZOLE 40 MG/1
40 CAPSULE, DELAYED RELEASE ORAL DAILY
Qty: 30 CAPSULE | Refills: 0 | Status: SHIPPED | OUTPATIENT
Start: 2021-12-09 | End: 2022-04-11

## 2021-12-09 NOTE — TELEPHONE ENCOUNTER
----- Message from Laron Blackwell sent at 12/9/2021 11:31 AM EST -----  Regarding: omeprazole (priLOSEC) 40 MG capsule  Contact: [phone]  Pt needs refill but his upcoming appointment isn't until 01/07.    [id number] - [place], [place] - [address] AT [facility] - [phone]  - [phone]    [address], [place] [address]   Phone:  [phone]  Fax:  [phone]

## 2021-12-23 ENCOUNTER — CLINICAL SUPPORT (OUTPATIENT)
Dept: ORTHOPEDIC SURGERY | Facility: CLINIC | Age: 46
End: 2021-12-23

## 2021-12-23 VITALS — BODY MASS INDEX: 28.23 KG/M2 | WEIGHT: 213 LBS | TEMPERATURE: 97 F | HEIGHT: 73 IN

## 2021-12-23 DIAGNOSIS — M17.11 PRIMARY OSTEOARTHRITIS OF RIGHT KNEE: Primary | ICD-10-CM

## 2021-12-23 PROCEDURE — 20610 DRAIN/INJ JOINT/BURSA W/O US: CPT | Performed by: NURSE PRACTITIONER

## 2021-12-23 RX ORDER — METHYLPREDNISOLONE ACETATE 80 MG/ML
80 INJECTION, SUSPENSION INTRA-ARTICULAR; INTRALESIONAL; INTRAMUSCULAR; SOFT TISSUE
Status: COMPLETED | OUTPATIENT
Start: 2021-12-23 | End: 2021-12-23

## 2021-12-23 RX ADMIN — METHYLPREDNISOLONE ACETATE 80 MG: 80 INJECTION, SUSPENSION INTRA-ARTICULAR; INTRALESIONAL; INTRAMUSCULAR; SOFT TISSUE at 11:04

## 2021-12-23 NOTE — PROGRESS NOTES
12/23/2021    Anthony Landers is here today for worsening knee pain. Pt has undergone injection of the knee in the past with good resolution of symptoms. Pt is requesting a repeat injection.     KNEE Injection Procedure Note:    Large Joint Arthrocentesis: R knee  Date/Time: 12/23/2021 11:04 AM  Consent given by: patient  Site marked: site marked  Timeout: Immediately prior to procedure a time out was called to verify the correct patient, procedure, equipment, support staff and site/side marked as required   Supporting Documentation  Indications: pain and joint swelling   Procedure Details  Location: knee - R knee  Preparation: Patient was prepped and draped in the usual sterile fashion  Needle size: 22 G  Approach: anterolateral  Medications administered: 80 mg methylPREDNISolone acetate 80 MG/ML; 2 mL lidocaine (cardiac)  Patient tolerance: patient tolerated the procedure well with no immediate complications          At the conclusion of the injection I discussed the importance of continued quad strengthening exercises on a daily basis. I will see the patient back if the symptoms should fail to improve or worsen.    Eseuqiel Ly, APRN  12/23/2021

## 2021-12-30 ENCOUNTER — OFFICE VISIT (OUTPATIENT)
Dept: FAMILY MEDICINE CLINIC | Facility: CLINIC | Age: 46
End: 2021-12-30

## 2021-12-30 VITALS
SYSTOLIC BLOOD PRESSURE: 114 MMHG | HEIGHT: 73 IN | OXYGEN SATURATION: 98 % | DIASTOLIC BLOOD PRESSURE: 84 MMHG | WEIGHT: 209 LBS | BODY MASS INDEX: 27.7 KG/M2 | HEART RATE: 83 BPM

## 2021-12-30 DIAGNOSIS — I10 ESSENTIAL HYPERTENSION: Primary | ICD-10-CM

## 2021-12-30 DIAGNOSIS — G60.9 IDIOPATHIC PERIPHERAL NEUROPATHY: ICD-10-CM

## 2021-12-30 DIAGNOSIS — E78.5 HYPERLIPIDEMIA, UNSPECIFIED HYPERLIPIDEMIA TYPE: ICD-10-CM

## 2021-12-30 DIAGNOSIS — M10.00 IDIOPATHIC GOUT, UNSPECIFIED CHRONICITY, UNSPECIFIED SITE: ICD-10-CM

## 2021-12-30 PROCEDURE — 99213 OFFICE O/P EST LOW 20 MIN: CPT | Performed by: INTERNAL MEDICINE

## 2021-12-30 RX ORDER — GABAPENTIN 300 MG/1
300 CAPSULE ORAL DAILY
Qty: 30 CAPSULE | Refills: 5 | Status: SHIPPED | OUTPATIENT
Start: 2021-12-30 | End: 2022-07-05

## 2021-12-30 NOTE — PROGRESS NOTES
Subjective Chief complaint is medication refills  Anthony Landers is a 46 y.o. male.     History of Present Illness Anthony is here today for medication refills.  He does have hypertension that is fairly well controlled with irbesartan.  He has gained a little bit of holiday weight since his last visit.  He does have hyperlipidemia.  He is on 10 mg of atorvastatin.  His last cholesterol testing in July looked excellent.  I do not think we need to check that today.  He is not having any gout attacks on his current dose of allopurinol.  He does need a refill on his gabapentin for his peripheral neuropathy.    The following portions of the patient's history were reviewed and updated as appropriate: allergies, current medications, past family history, past medical history, past social history, past surgical history and problem list.    Review of Systems   Constitutional: Negative for chills and fever.   Respiratory: Negative for cough, chest tightness and shortness of breath.    Neurological: Positive for light-headedness.        He occasionally has some lightheadedness that he associates with sinus congestion.       Objective   Physical Exam  Constitutional:       Appearance: Normal appearance.   Cardiovascular:      Rate and Rhythm: Normal rate and regular rhythm.   Pulmonary:      Effort: Pulmonary effort is normal.      Breath sounds: No wheezing, rhonchi or rales.   Abdominal:      General: Bowel sounds are normal.      Palpations: Abdomen is soft.      Tenderness: There is no abdominal tenderness. There is no guarding or rebound.   Musculoskeletal:      Right lower leg: No edema.      Left lower leg: No edema.   Neurological:      Mental Status: He is alert.           Assessment/Plan   Diagnoses and all orders for this visit:    1. Essential hypertension (Primary)  -     Comprehensive Metabolic Panel  -     CBC & Differential    2. Hyperlipidemia, unspecified hyperlipidemia type    3. Idiopathic gout, unspecified  chronicity, unspecified site  -     Uric Acid    4. Idiopathic peripheral neuropathy    Other orders  -     diclofenac (VOLTAREN) 50 MG EC tablet; Take 1 tablet by mouth 2 (Two) Times a Day.  Dispense: 180 tablet; Refill: 1  -     gabapentin (NEURONTIN) 300 MG capsule; Take 1 capsule by mouth Daily.  Dispense: 30 capsule; Refill: 5    Anthony is here today for follow-up on several medical problems all are stable.  We did renew his medications and we will check on status of his creatinine and blood count because he is on the diclofenac.

## 2021-12-31 LAB
ALBUMIN SERPL-MCNC: 4.9 G/DL (ref 4–5)
ALBUMIN/GLOB SERPL: 2.3 {RATIO} (ref 1.2–2.2)
ALP SERPL-CCNC: 87 IU/L (ref 44–121)
ALT SERPL-CCNC: 27 IU/L (ref 0–44)
AST SERPL-CCNC: 23 IU/L (ref 0–40)
BASOPHILS # BLD AUTO: 0.1 X10E3/UL (ref 0–0.2)
BASOPHILS NFR BLD AUTO: 1 %
BILIRUB SERPL-MCNC: 0.3 MG/DL (ref 0–1.2)
BUN SERPL-MCNC: 27 MG/DL (ref 6–24)
BUN/CREAT SERPL: 30 (ref 9–20)
CALCIUM SERPL-MCNC: 9.4 MG/DL (ref 8.7–10.2)
CHLORIDE SERPL-SCNC: 103 MMOL/L (ref 96–106)
CO2 SERPL-SCNC: 29 MMOL/L (ref 20–29)
CREAT SERPL-MCNC: 0.9 MG/DL (ref 0.76–1.27)
EOSINOPHIL # BLD AUTO: 0.2 X10E3/UL (ref 0–0.4)
EOSINOPHIL NFR BLD AUTO: 3 %
ERYTHROCYTE [DISTWIDTH] IN BLOOD BY AUTOMATED COUNT: 13.1 % (ref 11.6–15.4)
GLOBULIN SER CALC-MCNC: 2.1 G/DL (ref 1.5–4.5)
GLUCOSE SERPL-MCNC: 94 MG/DL (ref 65–99)
HCT VFR BLD AUTO: 52 % (ref 37.5–51)
HGB BLD-MCNC: 16.8 G/DL (ref 13–17.7)
IMM GRANULOCYTES # BLD AUTO: 0 X10E3/UL (ref 0–0.1)
IMM GRANULOCYTES NFR BLD AUTO: 0 %
LYMPHOCYTES # BLD AUTO: 1.9 X10E3/UL (ref 0.7–3.1)
LYMPHOCYTES NFR BLD AUTO: 22 %
MCH RBC QN AUTO: 27.5 PG (ref 26.6–33)
MCHC RBC AUTO-ENTMCNC: 32.3 G/DL (ref 31.5–35.7)
MCV RBC AUTO: 85 FL (ref 79–97)
MONOCYTES # BLD AUTO: 0.8 X10E3/UL (ref 0.1–0.9)
MONOCYTES NFR BLD AUTO: 9 %
NEUTROPHILS # BLD AUTO: 5.6 X10E3/UL (ref 1.4–7)
NEUTROPHILS NFR BLD AUTO: 65 %
PLATELET # BLD AUTO: 277 X10E3/UL (ref 150–450)
POTASSIUM SERPL-SCNC: 4.5 MMOL/L (ref 3.5–5.2)
PROT SERPL-MCNC: 7 G/DL (ref 6–8.5)
RBC # BLD AUTO: 6.12 X10E6/UL (ref 4.14–5.8)
SODIUM SERPL-SCNC: 144 MMOL/L (ref 134–144)
URATE SERPL-MCNC: 5.5 MG/DL (ref 3.8–8.4)
WBC # BLD AUTO: 8.5 X10E3/UL (ref 3.4–10.8)

## 2022-03-17 ENCOUNTER — OFFICE VISIT (OUTPATIENT)
Dept: FAMILY MEDICINE CLINIC | Facility: CLINIC | Age: 47
End: 2022-03-17

## 2022-03-17 VITALS
OXYGEN SATURATION: 97 % | DIASTOLIC BLOOD PRESSURE: 78 MMHG | HEART RATE: 95 BPM | SYSTOLIC BLOOD PRESSURE: 112 MMHG | WEIGHT: 213 LBS | HEIGHT: 73 IN | BODY MASS INDEX: 28.23 KG/M2

## 2022-03-17 DIAGNOSIS — M79.89 LEG SWELLING: ICD-10-CM

## 2022-03-17 DIAGNOSIS — M10.00 IDIOPATHIC GOUT, UNSPECIFIED CHRONICITY, UNSPECIFIED SITE: ICD-10-CM

## 2022-03-17 DIAGNOSIS — M25.473 ANKLE SWELLING, UNSPECIFIED LATERALITY: Primary | ICD-10-CM

## 2022-03-17 PROCEDURE — 99214 OFFICE O/P EST MOD 30 MIN: CPT | Performed by: INTERNAL MEDICINE

## 2022-03-17 RX ORDER — PREDNISONE 10 MG/1
10 TABLET ORAL 2 TIMES DAILY
Qty: 10 TABLET | Refills: 0 | Status: SHIPPED | OUTPATIENT
Start: 2022-03-17 | End: 2022-05-18

## 2022-03-17 RX ORDER — COLCHICINE 0.6 MG/1
0.6 TABLET ORAL DAILY
Qty: 10 TABLET | Refills: 0 | Status: SHIPPED | OUTPATIENT
Start: 2022-03-17 | End: 2022-08-25

## 2022-03-17 NOTE — PROGRESS NOTES
Subjective Chief complaint is foot and ankle swelling  Anthony Landers is a 46 y.o. male.     History of Present Illness Anthony is here today for some swelling in his ankles.  This started approximately 1 week ago.  Does have a history of gout.  He also wears some braces on his legs that may be cutting off some venous return.  He reports there is some painfulness to the ankles to palpation and also some redness.  He does have hypertension.  He is on irbesartan.  He has been taking diclofenac as an anti-inflammatory for other aches and pains including neck shoulders and hips.    The following portions of the patient's history were reviewed and updated as appropriate: allergies, current medications, past family history, past medical history, past social history, past surgical history and problem list.    Review of Systems   Constitutional: Negative for chills and fever.   Respiratory: Negative for chest tightness and shortness of breath.    Cardiovascular: Negative for chest pain.   Musculoskeletal: Positive for arthralgias, back pain, joint swelling and neck pain.       Objective   Physical Exam  Vitals and nursing note reviewed.   Constitutional:       Appearance: Normal appearance.   Cardiovascular:      Rate and Rhythm: Normal rate and regular rhythm.      Pulses: Normal pulses.      Heart sounds: No murmur heard.    No friction rub. No gallop.   Pulmonary:      Effort: Pulmonary effort is normal.      Breath sounds: No wheezing, rhonchi or rales.   Musculoskeletal:      Comments: There is pitting edema of both ankles.  This is tender to palpation.  There is some erythema and warmth on the lateral aspect of the right ankle more so than the lateral aspect of the left ankle.   Neurological:      Mental Status: He is alert.           Assessment/Plan   Diagnoses and all orders for this visit:    1. Ankle swelling, unspecified laterality (Primary)  -     CBC & Differential  -     Comprehensive Metabolic Panel  -      C-reactive Protein  -     Sedimentation Rate  -     Uric Acid  -     Rheumatoid Factor  -     XR Ankle 3+ View Right; Future  -     XR Ankle 3+ View Left; Future  -     Duplex Venous Lower Extremity - Bilateral CAR; Future    2. Idiopathic gout, unspecified chronicity, unspecified site  -     CBC & Differential  -     Comprehensive Metabolic Panel  -     C-reactive Protein  -     Sedimentation Rate  -     Uric Acid  -     Rheumatoid Factor  -     XR Ankle 3+ View Right; Future  -     XR Ankle 3+ View Left; Future    3. Leg swelling  -     Duplex Venous Lower Extremity - Bilateral CAR; Future    Other orders  -     colchicine 0.6 MG tablet; Take 1 tablet by mouth Daily.  Dispense: 10 tablet; Refill: 0  -     predniSONE (DELTASONE) 10 MG tablet; Take 1 tablet by mouth 2 (Two) Times a Day.  Dispense: 10 tablet; Refill: 0    Anthony is here today for swelling in his ankles.  Does not appear classically like gout.  However there is some warmth and erythema.  I am going to check his uric acid today.  We are going to get x-rays of both feet.  I do not think there would be a blood clot causing this but we are going to get some Doppler studies.  His arterial flow seems to be okay.  I am going to check some kidney labs and a blood count as well.

## 2022-03-18 LAB
ALBUMIN SERPL-MCNC: 4.2 G/DL (ref 4–5)
ALBUMIN/GLOB SERPL: 1.6 {RATIO} (ref 1.2–2.2)
ALP SERPL-CCNC: 138 IU/L (ref 44–121)
ALT SERPL-CCNC: 32 IU/L (ref 0–44)
AST SERPL-CCNC: 25 IU/L (ref 0–40)
BASOPHILS # BLD AUTO: 0.1 X10E3/UL (ref 0–0.2)
BASOPHILS NFR BLD AUTO: 1 %
BILIRUB SERPL-MCNC: 0.5 MG/DL (ref 0–1.2)
BUN SERPL-MCNC: 25 MG/DL (ref 6–24)
BUN/CREAT SERPL: 28 (ref 9–20)
CALCIUM SERPL-MCNC: 9.2 MG/DL (ref 8.7–10.2)
CHLORIDE SERPL-SCNC: 102 MMOL/L (ref 96–106)
CO2 SERPL-SCNC: 25 MMOL/L (ref 20–29)
CREAT SERPL-MCNC: 0.89 MG/DL (ref 0.76–1.27)
CRP SERPL-MCNC: 36 MG/L (ref 0–10)
EGFRCR SERPLBLD CKD-EPI 2021: 106 ML/MIN/1.73
EOSINOPHIL # BLD AUTO: 0.5 X10E3/UL (ref 0–0.4)
EOSINOPHIL NFR BLD AUTO: 6 %
ERYTHROCYTE [DISTWIDTH] IN BLOOD BY AUTOMATED COUNT: 13.5 % (ref 11.6–15.4)
ERYTHROCYTE [SEDIMENTATION RATE] IN BLOOD BY WESTERGREN METHOD: 8 MM/HR (ref 0–15)
GLOBULIN SER CALC-MCNC: 2.6 G/DL (ref 1.5–4.5)
GLUCOSE SERPL-MCNC: 87 MG/DL (ref 65–99)
HCT VFR BLD AUTO: 45.8 % (ref 37.5–51)
HGB BLD-MCNC: 15 G/DL (ref 13–17.7)
IMM GRANULOCYTES # BLD AUTO: 0 X10E3/UL (ref 0–0.1)
IMM GRANULOCYTES NFR BLD AUTO: 0 %
LYMPHOCYTES # BLD AUTO: 0.8 X10E3/UL (ref 0.7–3.1)
LYMPHOCYTES NFR BLD AUTO: 9 %
MCH RBC QN AUTO: 27.4 PG (ref 26.6–33)
MCHC RBC AUTO-ENTMCNC: 32.8 G/DL (ref 31.5–35.7)
MCV RBC AUTO: 84 FL (ref 79–97)
MONOCYTES # BLD AUTO: 1 X10E3/UL (ref 0.1–0.9)
MONOCYTES NFR BLD AUTO: 11 %
NEUTROPHILS # BLD AUTO: 6.7 X10E3/UL (ref 1.4–7)
NEUTROPHILS NFR BLD AUTO: 73 %
PLATELET # BLD AUTO: 325 X10E3/UL (ref 150–450)
POTASSIUM SERPL-SCNC: 4.6 MMOL/L (ref 3.5–5.2)
PROT SERPL-MCNC: 6.8 G/DL (ref 6–8.5)
RBC # BLD AUTO: 5.47 X10E6/UL (ref 4.14–5.8)
RHEUMATOID FACT SERPL-ACNC: <10 IU/ML
SODIUM SERPL-SCNC: 142 MMOL/L (ref 134–144)
URATE SERPL-MCNC: 5.4 MG/DL (ref 3.8–8.4)
WBC # BLD AUTO: 9.1 X10E3/UL (ref 3.4–10.8)

## 2022-03-25 ENCOUNTER — HOSPITAL ENCOUNTER (OUTPATIENT)
Dept: GENERAL RADIOLOGY | Facility: HOSPITAL | Age: 47
Discharge: HOME | End: 2022-03-25

## 2022-03-25 ENCOUNTER — HOSPITAL ENCOUNTER (OUTPATIENT)
Dept: CARDIOLOGY | Facility: HOSPITAL | Age: 47
Discharge: HOME | End: 2022-03-25

## 2022-03-25 DIAGNOSIS — M25.473 ANKLE SWELLING, UNSPECIFIED LATERALITY: ICD-10-CM

## 2022-03-25 DIAGNOSIS — M79.89 LEG SWELLING: ICD-10-CM

## 2022-03-25 DIAGNOSIS — M10.00 IDIOPATHIC GOUT, UNSPECIFIED CHRONICITY, UNSPECIFIED SITE: ICD-10-CM

## 2022-03-25 LAB

## 2022-03-25 PROCEDURE — 93970 EXTREMITY STUDY: CPT

## 2022-03-25 PROCEDURE — 73610 X-RAY EXAM OF ANKLE: CPT

## 2022-03-28 ENCOUNTER — LAB (OUTPATIENT)
Dept: FAMILY MEDICINE CLINIC | Facility: CLINIC | Age: 47
End: 2022-03-28

## 2022-03-28 DIAGNOSIS — R60.9 EDEMA, UNSPECIFIED TYPE: Primary | ICD-10-CM

## 2022-03-29 ENCOUNTER — TELEPHONE (OUTPATIENT)
Dept: ORTHOPEDIC SURGERY | Facility: CLINIC | Age: 47
End: 2022-03-29

## 2022-03-29 LAB
APPEARANCE UR: CLEAR
BACTERIA #/AREA URNS HPF: NORMAL /[HPF]
BILIRUB UR QL STRIP: NEGATIVE
CASTS URNS QL MICRO: NORMAL /LPF
COLOR UR: YELLOW
EPI CELLS #/AREA URNS HPF: NORMAL /HPF (ref 0–10)
GLUCOSE UR QL STRIP: NEGATIVE
HGB UR QL STRIP: NEGATIVE
KETONES UR QL STRIP: NEGATIVE
LEUKOCYTE ESTERASE UR QL STRIP: NEGATIVE
MICRO URNS: NORMAL
MICRO URNS: NORMAL
NITRITE UR QL STRIP: NEGATIVE
PH UR STRIP: 5.5 [PH] (ref 5–7.5)
PROT UR QL STRIP: NEGATIVE
RBC #/AREA URNS HPF: NORMAL /HPF (ref 0–2)
SP GR UR STRIP: 1.02 (ref 1–1.03)
UROBILINOGEN UR STRIP-MCNC: 0.2 MG/DL (ref 0.2–1)
WBC #/AREA URNS HPF: NORMAL /HPF (ref 0–5)

## 2022-03-29 NOTE — TELEPHONE ENCOUNTER
Let patient know that I would be happy to see him on Thursday morning at 745 to draw fluid off and give injection.  Okay to add him onto my schedule

## 2022-03-29 NOTE — TELEPHONE ENCOUNTER
Caller: VIC GOODEN    Relationship to patient: SELF    Best call back number:     Patient is needing: UNABLE TO WARM TRANSFER - REQUESTING TO HAVE FLUID DRAWN OFF HIS KNEE  IS REALLY SWOLLEN DNA PAINFUL.  PATIENT RQ CALL BACK BEFORE 130PM.

## 2022-04-01 ENCOUNTER — TELEPHONE (OUTPATIENT)
Dept: FAMILY MEDICINE CLINIC | Facility: CLINIC | Age: 47
End: 2022-04-01

## 2022-04-01 ENCOUNTER — OFFICE VISIT (OUTPATIENT)
Dept: ORTHOPEDIC SURGERY | Facility: CLINIC | Age: 47
End: 2022-04-01

## 2022-04-01 VITALS — TEMPERATURE: 97.3 F | BODY MASS INDEX: 26.82 KG/M2 | HEIGHT: 74 IN | WEIGHT: 209 LBS

## 2022-04-01 DIAGNOSIS — M17.11 PRIMARY OSTEOARTHRITIS OF RIGHT KNEE: Primary | ICD-10-CM

## 2022-04-01 PROCEDURE — 20610 DRAIN/INJ JOINT/BURSA W/O US: CPT | Performed by: NURSE PRACTITIONER

## 2022-04-01 RX ORDER — LIDOCAINE HYDROCHLORIDE 20 MG/ML
2 INJECTION, SOLUTION EPIDURAL; INFILTRATION; INTRACAUDAL; PERINEURAL
Status: COMPLETED | OUTPATIENT
Start: 2022-04-01 | End: 2022-04-01

## 2022-04-01 RX ORDER — METHYLPREDNISOLONE ACETATE 80 MG/ML
80 INJECTION, SUSPENSION INTRA-ARTICULAR; INTRALESIONAL; INTRAMUSCULAR; SOFT TISSUE
Status: COMPLETED | OUTPATIENT
Start: 2022-04-01 | End: 2022-04-01

## 2022-04-01 RX ADMIN — METHYLPREDNISOLONE ACETATE 80 MG: 80 INJECTION, SUSPENSION INTRA-ARTICULAR; INTRALESIONAL; INTRAMUSCULAR; SOFT TISSUE at 13:45

## 2022-04-01 RX ADMIN — LIDOCAINE HYDROCHLORIDE 2 ML: 20 INJECTION, SOLUTION EPIDURAL; INFILTRATION; INTRACAUDAL; PERINEURAL at 13:45

## 2022-04-01 NOTE — PROGRESS NOTES
4/1/2022    Anthony Landers is here today for worsening knee pain. Pt has undergone injection of the knee in the past with good resolution of symptoms. Pt is requesting a repeat injection.     KNEE Injection Procedure Note:    Large Joint Arthrocentesis: R knee  Date/Time: 4/1/2022 1:45 PM  Consent given by: patient  Site marked: site marked  Timeout: Immediately prior to procedure a time out was called to verify the correct patient, procedure, equipment, support staff and site/side marked as required   Supporting Documentation  Indications: pain and joint swelling   Procedure Details  Location: knee - R knee  Preparation: Patient was prepped and draped in the usual sterile fashion  Needle size: 22 G  Approach: anterolateral  Medications administered: 80 mg methylPREDNISolone acetate 80 MG/ML; 2 mL lidocaine PF 2% 2 %  Aspirate amount: 32 mL  Aspirate: clear  Patient tolerance: patient tolerated the procedure well with no immediate complications          At the conclusion of the injection I discussed the importance of continued quad strengthening exercises on a daily basis. I will see the patient back if the symptoms should fail to improve or worsen.    Tammi Hamm, APRN  4/1/2022

## 2022-04-01 NOTE — TELEPHONE ENCOUNTER
Caller: Anthony Landers    Relationship: Self    Best call back number:[phone]    Caller requesting test results: PATIENT     What test was performed: URINALYSIS     When was the test performed: 3/28/22    Where was the test performed: OFFICE    A message was left regarding the test results.  We were checking for proteinuria because of some leg swelling.  Advise options would be to prescribe a diuretic which may make his gout worse or to use support stockings which would be what I would favor.

## 2022-04-04 NOTE — TELEPHONE ENCOUNTER
Rx Refill Note  Requested Prescriptions     Pending Prescriptions Disp Refills   • atorvastatin (LIPITOR) 10 MG tablet [Pharmacy Med Name: ATORVASTATIN 10 MG TABLET] 30 tablet 5     Sig: TAKE 1 TABLET BY MOUTH EVERY DAY      Last office visit with prescribing clinician: 3/17/2022      Next office visit with prescribing clinician: Visit date not found            Robert Rawls MA  04/04/22, 13:18 EDT

## 2022-04-05 RX ORDER — ATORVASTATIN CALCIUM 10 MG/1
TABLET, FILM COATED ORAL
Qty: 30 TABLET | Refills: 5 | Status: SHIPPED | OUTPATIENT
Start: 2022-04-05 | End: 2022-11-03 | Stop reason: SDUPTHER

## 2022-04-11 RX ORDER — OMEPRAZOLE 40 MG/1
CAPSULE, DELAYED RELEASE ORAL
Qty: 90 CAPSULE | Refills: 0 | Status: SHIPPED | OUTPATIENT
Start: 2022-04-11 | End: 2022-05-04 | Stop reason: SDUPTHER

## 2022-04-12 ENCOUNTER — TELEPHONE (OUTPATIENT)
Dept: FAMILY MEDICINE CLINIC | Facility: CLINIC | Age: 47
End: 2022-04-12

## 2022-04-12 NOTE — TELEPHONE ENCOUNTER
Caller: Anthony Landers    Relationship to patient: Self    Best call back number: [phone]    Patient is needing: PATIENT IS WANTING TO KNOW IF HE SHOULD GO BACK ON HIS MEDICATIONS, atorvastatin (LIPITOR) 10 MG tablet AND diclofenac (VOLTAREN) 50 MG EC tablet  PATIENT STATES TO LEAVE A VOICEMAIL IF HE CANT ANSWER    Message was left that it is okay to resume the medications

## 2022-04-13 ENCOUNTER — TELEPHONE (OUTPATIENT)
Dept: FAMILY MEDICINE CLINIC | Facility: CLINIC | Age: 47
End: 2022-04-13

## 2022-04-13 NOTE — TELEPHONE ENCOUNTER
Caller: Anthony Landers    Relationship: Self    Best call back number: [phone]     What is the best time to reach you: BEFORE 1:30     Who are you requesting to speak with (clinical staff, provider,  specific staff member): CLINICAL    What was the call regarding: PATIENT WAS RETURNING HIS MISSED CALL FROM DR. SPICER ABOUT HIS MEDICATION CONCERN.  PLEASE ADVISE.     Do you require a callback: YES    Message was left again regarding resuming his medicines.  I did advise that if he has any further questions he may want to set up an office visit

## 2022-04-25 ENCOUNTER — TELEPHONE (OUTPATIENT)
Dept: ORTHOPEDIC SURGERY | Facility: CLINIC | Age: 47
End: 2022-04-25

## 2022-04-25 NOTE — TELEPHONE ENCOUNTER
Caller: VIC GOODEN    Relationship: SELF    Best call back number: [phone]    What is the best time to reach you: ON 4.26.2022 BETWEEN 11AM AND 1:30PM    What was the call regarding: PT LIVES ALONE AND STATES THAT HE NEEDS KNEE SURGERY. HE WOULD LIKE TO KNOW IF THE OFFICE HAS A WAY TO HELP HIM GET AFTER CARE.     Do you require a callback: YES

## 2022-05-04 ENCOUNTER — TELEPHONE (OUTPATIENT)
Dept: GASTROENTEROLOGY | Facility: CLINIC | Age: 47
End: 2022-05-04

## 2022-05-04 RX ORDER — OMEPRAZOLE 40 MG/1
40 CAPSULE, DELAYED RELEASE ORAL DAILY
Qty: 30 CAPSULE | Refills: 0 | Status: SHIPPED | OUTPATIENT
Start: 2022-05-04 | End: 2022-05-18 | Stop reason: SDUPTHER

## 2022-05-04 NOTE — TELEPHONE ENCOUNTER
----- Message from Laron Hickman sent at 5/4/2022 11:31 AM EDT -----  Regarding: omeprazole (priLOSEC) 40 MG capsule  Contact: [phone]  Pt is requesting a refill, his appt isn't until 04/18.

## 2022-05-12 RX ORDER — ALLOPURINOL 100 MG/1
TABLET ORAL
Qty: 30 TABLET | Refills: 5 | Status: SHIPPED | OUTPATIENT
Start: 2022-05-12 | End: 2022-11-03 | Stop reason: SDUPTHER

## 2022-05-18 ENCOUNTER — OFFICE VISIT (OUTPATIENT)
Dept: GASTROENTEROLOGY | Facility: CLINIC | Age: 47
End: 2022-05-18

## 2022-05-18 VITALS
DIASTOLIC BLOOD PRESSURE: 75 MMHG | HEIGHT: 60 IN | OXYGEN SATURATION: 96 % | TEMPERATURE: 96.6 F | WEIGHT: 203 LBS | BODY MASS INDEX: 39.85 KG/M2 | SYSTOLIC BLOOD PRESSURE: 119 MMHG | HEART RATE: 96 BPM

## 2022-05-18 DIAGNOSIS — K21.9 GASTROESOPHAGEAL REFLUX DISEASE WITHOUT ESOPHAGITIS: ICD-10-CM

## 2022-05-18 DIAGNOSIS — K58.0 IRRITABLE BOWEL SYNDROME WITH DIARRHEA: Primary | ICD-10-CM

## 2022-05-18 PROCEDURE — 99213 OFFICE O/P EST LOW 20 MIN: CPT | Performed by: NURSE PRACTITIONER

## 2022-05-18 RX ORDER — HYOSCYAMINE SULFATE EXTENDED-RELEASE 0.38 MG/1
0.38 TABLET ORAL 3 TIMES DAILY PRN
Qty: 60 TABLET | Refills: 3 | Status: SHIPPED | OUTPATIENT
Start: 2022-05-18 | End: 2022-11-08

## 2022-05-18 RX ORDER — OMEPRAZOLE 40 MG/1
40 CAPSULE, DELAYED RELEASE ORAL DAILY
Qty: 90 CAPSULE | Refills: 3 | Status: SHIPPED | OUTPATIENT
Start: 2022-05-18

## 2022-05-18 NOTE — PROGRESS NOTES
Chief Complaint   Patient presents with   • Heartburn   • Irritable Bowel Syndrome       HPI    Anthony Landers is a  47 y.o. male here for a follow up visit for GERD and IBS-D.    Patient follows with Dr. Robles and myself.    Patient has been maintained on Prilosec 40 mg once daily to good effect.  EGD in May 2014 was normal with negative pathology  Currently denies abdominal pain, nausea, vomiting, dysphagia, odynophagia.  Appetite is good.  His weight is stable.    He takes Levsin as needed for abdominal cramps.  Denies constipation, diarrhea, or rectal bleeding.   Colonoscopy 2014 with nonbleeding terminal hemorrhoids otherwise normal, recall 10 years    Past Medical History:   Diagnosis Date   • Asthma    • GERD (gastroesophageal reflux disease)    • Hypertension    • Irritable bowel syndrome        Past Surgical History:   Procedure Laterality Date   • COLONOSCOPY  05/21/2014    ih   • UPPER GASTROINTESTINAL ENDOSCOPY  05/21/2014    nml       Scheduled Meds:     Continuous Infusions: No current facility-administered medications for this visit.      PRN Meds:     Allergies   Allergen Reactions   • Propranolol        Social History     Socioeconomic History   • Marital status: Single   Tobacco Use   • Smoking status: Never Smoker   • Smokeless tobacco: Never Used   Vaping Use   • Vaping Use: Never used   Substance and Sexual Activity   • Alcohol use: No   • Drug use: No   • Sexual activity: Defer       Family History   Problem Relation Age of Onset   • Aortic stenosis Father    • Febrile seizures Father        Review of Systems   Constitutional: Negative for activity change, appetite change, fatigue, fever and unexpected weight change.   HENT: Negative for trouble swallowing.    Respiratory: Negative for apnea, cough, choking, chest tightness, shortness of breath and wheezing.    Cardiovascular: Negative for chest pain, palpitations and leg swelling.   Gastrointestinal: Negative for abdominal distention,  abdominal pain, anal bleeding, blood in stool, constipation, diarrhea, nausea, rectal pain and vomiting.       Vitals:    05/18/22 1322   BP: 119/75   Pulse: 96   Temp: 96.6 °F (35.9 °C)   SpO2: 96%       Physical Exam  Constitutional:       Appearance: He is well-developed.   Abdominal:      General: Bowel sounds are normal. There is no distension.      Palpations: Abdomen is soft. There is no mass.      Tenderness: There is no abdominal tenderness. There is no guarding.      Hernia: No hernia is present.   Skin:     General: Skin is warm and dry.      Capillary Refill: Capillary refill takes less than 2 seconds.   Neurological:      Mental Status: He is alert and oriented to person, place, and time.   Psychiatric:         Behavior: Behavior normal.       Assessment    Diagnoses and all orders for this visit:    1. Irritable bowel syndrome with diarrhea (Primary)    2. Gastroesophageal reflux disease without esophagitis    Other orders  -     hyoscyamine (LEVBID) 0.375 MG 12 hr tablet; Take 1 tablet by mouth 3 (Three) Times a Day As Needed for Cramping or Diarrhea.  Dispense: 60 tablet; Refill: 3  -     omeprazole (priLOSEC) 40 MG capsule; Take 1 capsule by mouth Daily.  Dispense: 90 capsule; Refill: 3       Plan    Stable IBS-D, continue as needed Levbid.  Colonoscopy for screening purposes 2 years.    Stable GERD, continue PPI therapy.  Follow an antireflux diet  Antireflux measures and dietary modifications reviewed. Low acid diet reviewed. Keep head of bed elevated. Stop eating/drinking at least 3 hours prior to bedtime. Eliminate caffeine and carbonated beverages.  Weight loss encouraged if BMI over 25.     Return to clinic 1 year.          MAZIN Ralph  [facility]  [address] [place] [address]  Office: [phone]

## 2022-06-10 RX ORDER — IRBESARTAN 150 MG/1
TABLET ORAL
Qty: 30 TABLET | Refills: 5 | Status: SHIPPED | OUTPATIENT
Start: 2022-06-10 | End: 2022-11-03 | Stop reason: SDUPTHER

## 2022-07-05 ENCOUNTER — OFFICE VISIT (OUTPATIENT)
Dept: ORTHOPEDIC SURGERY | Facility: CLINIC | Age: 47
End: 2022-07-05

## 2022-07-05 VITALS — WEIGHT: 205 LBS | BODY MASS INDEX: 26.31 KG/M2 | HEIGHT: 74 IN | TEMPERATURE: 97.7 F

## 2022-07-05 DIAGNOSIS — M17.11 PRIMARY OSTEOARTHRITIS OF RIGHT KNEE: Primary | ICD-10-CM

## 2022-07-05 PROBLEM — M17.9 OA (OSTEOARTHRITIS) OF KNEE: Status: ACTIVE | Noted: 2022-07-05

## 2022-07-05 PROCEDURE — 99214 OFFICE O/P EST MOD 30 MIN: CPT | Performed by: ORTHOPAEDIC SURGERY

## 2022-07-05 RX ORDER — MELOXICAM 15 MG/1
15 TABLET ORAL ONCE
Status: CANCELLED | OUTPATIENT
Start: 2022-09-09 | End: 2022-07-05

## 2022-07-05 RX ORDER — CEFAZOLIN SODIUM 2 G/100ML
2 INJECTION, SOLUTION INTRAVENOUS ONCE
Status: CANCELLED | OUTPATIENT
Start: 2022-09-09 | End: 2022-07-05

## 2022-07-05 RX ORDER — POVIDONE-IODINE 10 MG/ML
SOLUTION TOPICAL ONCE
Status: CANCELLED | OUTPATIENT
Start: 2022-09-09 | End: 2022-07-05

## 2022-07-05 RX ORDER — CHLORHEXIDINE GLUCONATE 500 MG/1
CLOTH TOPICAL 2 TIMES DAILY
Status: CANCELLED | OUTPATIENT
Start: 2022-07-05

## 2022-07-05 RX ORDER — GABAPENTIN 300 MG/1
CAPSULE ORAL
Qty: 30 CAPSULE | Refills: 2 | Status: SHIPPED | OUTPATIENT
Start: 2022-07-05 | End: 2022-10-27 | Stop reason: SDUPTHER

## 2022-07-05 RX ORDER — PREGABALIN 75 MG/1
150 CAPSULE ORAL ONCE
Status: CANCELLED | OUTPATIENT
Start: 2022-09-09 | End: 2022-07-05

## 2022-07-05 NOTE — PROGRESS NOTES
Patient: Anthony Landers  YOB: 1975 47 y.o. male  Medical Record Number: [id number]    Chief Complaints:   Chief Complaint   Patient presents with   • Right Knee - Follow-up, Pain       History of Present Illness:Anthony Landers is a 47 y.o. male who presents for follow-up of severe constant right knee pain.  It is markedly progressed over the last few years.  He feels he can no longer delay needing surgery.  He has had previous injections physical therapy anti-inflammatories despite these conservative measures his pain is progressed to the point where he can only walk short distances and has limited basic activities of daily living.  He describes the pain as a 10 out of 10.  The knee is constantly popping grinding and giving way on him.    Allergies:   Allergies   Allergen Reactions   • Propranolol        Medications:   Current Outpatient Medications   Medication Sig Dispense Refill   • allopurinol (ZYLOPRIM) 100 MG tablet TAKE 1 TABLET BY MOUTH EVERY DAY 30 tablet 5   • colchicine 0.6 MG tablet Take 1 tablet by mouth Daily. 10 tablet 0   • gabapentin (NEURONTIN) 300 MG capsule TAKE 1 CAPSULE BY MOUTH EVERY DAY 30 capsule 2   • hyoscyamine (LEVBID) 0.375 MG 12 hr tablet Take 1 tablet by mouth 3 (Three) Times a Day As Needed for Cramping or Diarrhea. 60 tablet 3   • irbesartan (AVAPRO) 150 MG tablet TAKE 1 TABLET BY MOUTH EVERY DAY AT NIGHT 30 tablet 5   • omeprazole (priLOSEC) 40 MG capsule Take 1 capsule by mouth Daily. 90 capsule 3   • atorvastatin (LIPITOR) 10 MG tablet TAKE 1 TABLET BY MOUTH EVERY DAY 30 tablet 5   • diclofenac (VOLTAREN) 50 MG EC tablet Take 1 tablet by mouth 2 (Two) Times a Day. 180 tablet 1   • guaiFENesin (MUCINEX) 600 MG 12 hr tablet Take  by mouth.       No current facility-administered medications for this visit.         The following portions of the patient's history were reviewed and updated as appropriate: allergies, current medications, past family history, past medical  "history, past social history, past surgical history and problem list.    Review of Systems:   A 14 point review of systems was performed. All systems negative except pertinent positives/negative listed in HPI above    Physical Exam:   Vitals:    07/05/22 1215   Temp: 97.7 °F (36.5 °C)   Weight: 93 kg (205 lb)   Height: 188 cm (74\")       General: A and O x 3, ASA, NAD    SCLERA:    Normal    DENTITION:   Normal  Knee:  right    ALIGNMENT:     Varus  ,   Patella  tracks  midline    GAIT:    Antalgic    SKIN:    No abnormality    RANGE OF MOTION:   10  -  85   DEG    STRENGTH:   4  / 5    LIGAMENTS:    No varus / valgus instability.   Negative  Lachman.    MENISCUS:     Negative   Therese       DISTAL PULSES:    Paplable    DISTAL SENSATION :   Intact    LYMPHATICS:     No   lymphadenopathy    OTHER:          - Positive   effusion      - Crepitance with ROM       Radiology:  Xrays 3views right knee (ap,lateral, sunrise) were ordered and reviewed for evaluation of knee pain demonstratingadvanced varus osteoarthritis with bone on bone articulation, subchondral cysts, and periarticular osteophytes  todays xrays were compared to previous xrays and demonstrate no change    Assessment/Plan Right knee endstage OA.  Continuation of conservative management vs. TKA discussed.  The patient wishes to proceed with total knee replacement.  At this point the patient has failed the full compliment of conservative treatment and stating complete understanding of the risks/benefits/ anternatives wishes to proceed with surgical treatment.    Risk and benefits of surgery were reviewed.  Including, but not limited to, blood clots or pulmonary embolism, anesthesia risk, infection, fracture, skin/leg numbness, persistent pain/crepitance/popping/catching, failure of the implant, need for future surgeries, hematoma, possible nerve or blood vessel injury, need for transfusion, and potential risk of stroke,heart attack or death, among others.  " The patient understands and wishes to proceed.     It was explained that if tissue has been repaired or reconstructed, there is also an increased chance of failure which may require further management.  Following the completion of the discussion, the patient expressed understanding of this planned course of care, all their questions were answered and consent will be obtained preoperatively.    Operative Plan: right Smith and Nephdara Oxinium Total Knee Replacement an overnight stay vs OP with home health rehab

## 2022-07-05 NOTE — TELEPHONE ENCOUNTER
Rx Refill Note  Requested Prescriptions     Pending Prescriptions Disp Refills   • gabapentin (NEURONTIN) 300 MG capsule [Pharmacy Med Name: GABAPENTIN 300 MG CAPSULE] 30 capsule      Sig: TAKE 1 CAPSULE BY MOUTH EVERY DAY      Last office visit with prescribing clinician: 3/17/2022      Next office visit with prescribing clinician: Visit date not found            Robert Rawls MA  07/05/22, 07:25 EDT

## 2022-07-13 ENCOUNTER — HOSPITAL ENCOUNTER (OUTPATIENT)
Facility: HOSPITAL | Age: 47
Setting detail: HOSPITAL OUTPATIENT SURGERY
End: 2022-07-13
Attending: ORTHOPAEDIC SURGERY | Admitting: ORTHOPAEDIC SURGERY

## 2022-08-25 ENCOUNTER — PRE-ADMISSION TESTING (OUTPATIENT)
Dept: PREADMISSION TESTING | Facility: HOSPITAL | Age: 47
End: 2022-08-25

## 2022-08-25 VITALS
BODY MASS INDEX: 38.83 KG/M2 | DIASTOLIC BLOOD PRESSURE: 72 MMHG | SYSTOLIC BLOOD PRESSURE: 110 MMHG | WEIGHT: 205.7 LBS | HEART RATE: 92 BPM | RESPIRATION RATE: 16 BRPM | HEIGHT: 61 IN | OXYGEN SATURATION: 98 % | TEMPERATURE: 98 F

## 2022-08-25 LAB
ANION GAP SERPL CALCULATED.3IONS-SCNC: 9.6 MMOL/L (ref 5–15)
BUN SERPL-MCNC: 21 MG/DL (ref 6–20)
BUN/CREAT SERPL: 23.6 (ref 7–25)
CALCIUM SPEC-SCNC: 9.1 MG/DL (ref 8.6–10.5)
CHLORIDE SERPL-SCNC: 103 MMOL/L (ref 98–107)
CO2 SERPL-SCNC: 27.4 MMOL/L (ref 22–29)
CREAT SERPL-MCNC: 0.89 MG/DL (ref 0.76–1.27)
DEPRECATED RDW RBC AUTO: 42.3 FL (ref 37–54)
EGFRCR SERPLBLD CKD-EPI 2021: 105.1 ML/MIN/1.73
ERYTHROCYTE [DISTWIDTH] IN BLOOD BY AUTOMATED COUNT: 14.1 % (ref 12.3–15.4)
GLUCOSE SERPL-MCNC: 91 MG/DL (ref 65–99)
HCT VFR BLD AUTO: 48.5 % (ref 37.5–51)
HGB BLD-MCNC: 15.4 G/DL (ref 13–17.7)
MCH RBC QN AUTO: 26.3 PG (ref 26.6–33)
MCHC RBC AUTO-ENTMCNC: 31.8 G/DL (ref 31.5–35.7)
MCV RBC AUTO: 82.8 FL (ref 79–97)
PLATELET # BLD AUTO: 272 10*3/MM3 (ref 140–450)
PMV BLD AUTO: 9.6 FL (ref 6–12)
POTASSIUM SERPL-SCNC: 4.1 MMOL/L (ref 3.5–5.2)
QT INTERVAL: 351 MS
RBC # BLD AUTO: 5.86 10*6/MM3 (ref 4.14–5.8)
SODIUM SERPL-SCNC: 140 MMOL/L (ref 136–145)
WBC NRBC COR # BLD: 6.84 10*3/MM3 (ref 3.4–10.8)

## 2022-08-25 PROCEDURE — 85027 COMPLETE CBC AUTOMATED: CPT

## 2022-08-25 PROCEDURE — 36415 COLL VENOUS BLD VENIPUNCTURE: CPT

## 2022-08-25 PROCEDURE — 93010 ELECTROCARDIOGRAM REPORT: CPT | Performed by: INTERNAL MEDICINE

## 2022-08-25 PROCEDURE — 80048 BASIC METABOLIC PNL TOTAL CA: CPT

## 2022-08-25 PROCEDURE — 93005 ELECTROCARDIOGRAM TRACING: CPT

## 2022-08-25 RX ORDER — CHLORHEXIDINE GLUCONATE 500 MG/1
CLOTH TOPICAL TAKE AS DIRECTED
COMMUNITY
End: 2022-09-19 | Stop reason: HOSPADM

## 2022-08-25 ASSESSMENT — KOOS JR
KOOS JR SCORE: 54.84
KOOS JR SCORE: 13

## 2022-08-25 NOTE — DISCHARGE INSTRUCTIONS
CHLORHEXIDINE CLOTH INSTRUCTIONS  The morning of surgery follow these instructions using the Chlorhexidine cloths you've been given.  These steps reduce bacteria on the body.  Do not use the cloths near your eyes, ears mouth, genitalia or on open wounds.  Throw the cloths away after use but do not try to flush them down a toilet.      Open and remove one cloth at a time from the package.    Leave the cloth unfolded and begin the bathing.  Massage the skin with the cloths using gentle pressure to remove bacteria.  Do not scrub harshly.   Follow the steps below with one 2% CHG cloth per area (6 total cloths).  One cloth for neck, shoulders and chest.  One cloth for both arms, hands, fingers and underarms (do underarms last).  One cloth for the abdomen followed by groin.  One cloth for right leg and foot including between the toes.  One cloth for left leg and foot including between the toes.  The last cloth is to be used for the back of the neck, back and buttocks.    Allow the CHG to air dry 3 minutes on the skin which will give it time to work and decrease the chance of irritation.  The skin may feel sticky until it is dry.  Do not rinse with water or any other liquid or you will lose the beneficial effects of the CHG.  If mild skin irritation occurs, do rinse the skin to remove the CHG.  Report this to the nurse at time of admission.  Do not apply lotions, creams, ointments, deodorants or perfumes after using the clothes. Dress in clean clothes before coming to the hospital.     Take the following medications the morning of surgery: OMEPRAZOLE    ARRIVAL TIME WILL BE GIVEN TO YOU BY DR. DORSEY'S OFFICE      If you are on prescription narcotic pain medication to control your pain you may also take that medication the morning of surgery.    General Instructions:  Do not eat solid food after midnight the night before surgery.  You may drink clear liquids day of surgery but must stop at least one hour before your  hospital arrival time.  It is beneficial for you to have a clear drink that contains carbohydrates the day of surgery.  We suggest a 12 to 20 ounce bottle of Gatorade or Powerade for non-diabetic patients or a 12 to 20 ounce bottle of G2 or Powerade Zero for diabetic patients. (Pediatric patients, are not advised to drink a 12 to 20 ounce carbohydrate drink)    Clear liquids are liquids you can see through.  Nothing red in color.     Plain water                               Sports drinks  Sodas                                   Gelatin (Jell-O)  Fruit juices without pulp such as white grape juice and apple juice  Popsicles that contain no fruit or yogurt  Tea or coffee (no cream or milk added)  Gatorade / Powerade  G2 / Powerade Zero    Infants may have breast milk up to four hours before surgery.  Infants drinking formula may drink formula up to six hours before surgery.   Patients who avoid smoking, chewing tobacco and alcohol for 4 weeks prior to surgery have a reduced risk of post-operative complications.  Quit smoking as many days before surgery as you can.  Do not smoke, use chewing tobacco or drink alcohol the day of surgery.   If applicable bring your C-PAP/ BI-PAP machine.  Bring any papers given to you in the doctor’s office.  Wear clean comfortable clothes.  Do not wear contact lenses, false eyelashes or make-up.  Bring a case for your glasses.   Bring crutches or walker if applicable.  Remove all piercings.  Leave jewelry and any other valuables at home.  Hair extensions with metal clips must be removed prior to surgery.  The Pre-Admission Testing nurse will instruct you to bring medications if unable to obtain an accurate list in Pre-Admission Testing.        If you were given a blood bank ID arm band remember to bring it with you the day of surgery.    Preventing a Surgical Site Infection:  For 2 to 3 days before surgery, avoid shaving with a razor because the razor can irritate skin and make it  easier to develop an infection.    Any areas of open skin can increase the risk of a post-operative wound infection by allowing bacteria to enter and travel throughout the body.  Notify your surgeon if you have any skin wounds / rashes even if it is not near the expected surgical site.  The area will need assessed to determine if surgery should be delayed until it is healed.  The night prior to surgery shower using a fresh bar of anti-bacterial soap (such as Dial) and clean washcloth.  Sleep in a clean bed with clean clothing.  Do not allow pets to sleep with you.  Shower on the morning of surgery using a fresh bar of anti-bacterial soap (such as Dial) and clean washcloth.  Dry with a clean towel and dress in clean clothing.  Ask your surgeon if you will be receiving antibiotics prior to surgery.  Make sure you, your family, and all healthcare providers clean their hands with soap and water or an alcohol based hand  before caring for you or your wound.    Day of surgery:  Your arrival time is approximately two hours before your scheduled surgery time.  Upon arrival, a Pre-op nurse and Anesthesiologist will review your health history, obtain vital signs, and answer questions you may have.  The only belongings needed at this time will be a list of your home medications and if applicable your C-PAP/BI-PAP machine.  A Pre-op nurse will start an IV and you may receive medication in preparation for surgery, including something to help you relax.     Please be aware that surgery does come with discomfort.  We want to make every effort to control your discomfort so please discuss any uncontrolled symptoms with your nurse.   Your doctor will most likely have prescribed pain medications.      If you are going home after surgery you will receive individualized written care instructions before being discharged.  A responsible adult must drive you to and from the hospital on the day of your surgery and stay with you for  24 hours.  Discharge prescriptions can be filled by the hospital pharmacy during regular pharmacy hours.  If you are having surgery late in the day/evening your prescription may be e-prescribed to your pharmacy.  Please verify your pharmacy hours or chose a 24 hour pharmacy to avoid not having access to your prescription because your pharmacy has closed for the day.    If you are staying overnight following surgery, you will be transported to your hospital room following the recovery period.  [facility] has all private rooms.    If you have any questions please call Pre-Admission Testing at [phone].  Deductibles and co-payments are collected on the day of service. Please be prepared to pay the required co-pay, deductible or deposit on the day of service as defined by your plan.    Call your surgeon immediately if you experience any of the following symptoms:  Sore Throat  Shortness of Breath or difficulty breathing  Cough  Chills  Body soreness or muscle pain  Headache  Fever  New loss of taste or smell  Do not arrive for your surgery ill.  Your procedure will need to be rescheduled to another time.  You will need to call your physician before the day of surgery to avoid any unnecessary exposure to hospital staff as well as other patients.

## 2022-09-01 ENCOUNTER — OFFICE VISIT (OUTPATIENT)
Dept: ORTHOPEDIC SURGERY | Facility: CLINIC | Age: 47
End: 2022-09-01

## 2022-09-01 VITALS
TEMPERATURE: 97.1 F | SYSTOLIC BLOOD PRESSURE: 122 MMHG | BODY MASS INDEX: 39.2 KG/M2 | DIASTOLIC BLOOD PRESSURE: 78 MMHG | WEIGHT: 207.6 LBS | HEIGHT: 61 IN

## 2022-09-01 DIAGNOSIS — M17.11 PRIMARY OSTEOARTHRITIS OF RIGHT KNEE: Primary | ICD-10-CM

## 2022-09-01 PROCEDURE — 73562 X-RAY EXAM OF KNEE 3: CPT | Performed by: NURSE PRACTITIONER

## 2022-09-01 PROCEDURE — S0260 H&P FOR SURGERY: HCPCS | Performed by: NURSE PRACTITIONER

## 2022-09-01 PROCEDURE — 77077 JOINT SURVEY SINGLE VIEW: CPT | Performed by: ORTHOPAEDIC SURGERY

## 2022-09-01 NOTE — H&P (VIEW-ONLY)
Patient: Anthony Landers    Date of Admission: 9/9/2022    YOB: 1975    Medical Record Number: [id number]    Admitting Physician: Dr. Edwin Ayala    Reason for Admission: End Stage Right Knee OA    History of Present Illness: 47 y.o. male presents with severe end stage knee osteoarthritis which has not been responsive to the full compliment of conservative measures. Despite conservative attempts, there is still severe, constant activity limiting pain. Given the severity of the pain, the patient has elected to proceed with knee replacement.    Allergies:   Allergies   Allergen Reactions   • Propranolol Shortness Of Breath         Current Medications:  Home Medications:    Current Outpatient Medications on File Prior to Visit   Medication Sig   • allopurinol (ZYLOPRIM) 100 MG tablet TAKE 1 TABLET BY MOUTH EVERY DAY   • atorvastatin (LIPITOR) 10 MG tablet TAKE 1 TABLET BY MOUTH EVERY DAY   • Chlorhexidine Gluconate Cloth 2 % pads Apply  topically Take As Directed. PRIOR TO SURGERY   • diclofenac (VOLTAREN) 50 MG EC tablet TAKE 1 TABLET BY MOUTH TWICE A DAY (Patient taking differently: HOLD PRIOR TO SURGERY PER MD INSTRUCTIONS)   • gabapentin (NEURONTIN) 300 MG capsule TAKE 1 CAPSULE BY MOUTH EVERY DAY (Patient taking differently: Every Evening.)   • hyoscyamine (LEVBID) 0.375 MG 12 hr tablet Take 1 tablet by mouth 3 (Three) Times a Day As Needed for Cramping or Diarrhea. (Patient taking differently: Take 0.375 mg by mouth Every 12 (Twelve) Hours As Needed for Cramping or Diarrhea.)   • irbesartan (AVAPRO) 150 MG tablet TAKE 1 TABLET BY MOUTH EVERY DAY AT NIGHT   • omeprazole (priLOSEC) 40 MG capsule Take 1 capsule by mouth Daily.     No current facility-administered medications on file prior to visit.     PRN Meds:.    PMH:     Past Medical History:   Diagnosis Date   • Asthma    • GERD (gastroesophageal reflux disease)    • Gout    • Hand, foot and mouth disease 09/2021   • Hyperlipidemia    • Hypertension  "   • Irritable bowel syndrome    • Needle phobia    • Peripheral neuropathy    • Primary osteoarthritis of right knee        PF/Surg/Soc Hx:     Past Surgical History:   Procedure Laterality Date   • COLONOSCOPY  05/21/2014    ih   • LACERATION REPAIR Right 1989    UPPER EYE   • UPPER GASTROINTESTINAL ENDOSCOPY  05/21/2014    nml        Social History     Occupational History   • Not on file   Tobacco Use   • Smoking status: Never Smoker   • Smokeless tobacco: Never Used   Vaping Use   • Vaping Use: Never used   Substance and Sexual Activity   • Alcohol use: No   • Drug use: No   • Sexual activity: Defer      Social History     Social History Narrative   • Not on file        Family History   Problem Relation Age of Onset   • Aortic stenosis Father    • Febrile seizures Father    • Malig Hyperthermia Neg Hx          Review of Systems:   A 14 point review of systems was performed, pertinent positives discussed above, all other systems are negative    Physical Exam: 47 y.o. male  Vital Signs :   Vitals:    09/01/22 1347   BP: 122/78   BP Location: Left arm   Patient Position: Sitting   Cuff Size: Large Adult   Temp: 97.1 °F (36.2 °C)   TempSrc: Temporal   Weight: 94.2 kg (207 lb 9.6 oz)   Height: 154.9 cm (60.98\")     General: Alert and Oriented x 3, No acute distress.  Psych: mood and affect appropriate; recent and remote memory intact  Eyes: conjunctiva clear; pupils equally round and reactive, sclera nonicteric  CV: no peripheral edema  Resp: normal respiratory effort  Skin: no rashes or wounds; normal turgor  Musculosketetal; pain and crepitance with knee range of motion  Vascular: palpable distal pulses    Xrays:  -3 views (AP, lateral, and sunrise) were reviewed demonstrating end-stage OA with bone on bone articulation.  -A full length AP xray was ordered and reviewed today for purposes of operative alignment demonstrating end stage arthritic findings. There are no previous full length films for " review    Assessment:  End-stage Right knee osteoarthritis. Conservative measures have failed.      Plan:  The plan is to proceed with Right Total Knee Replacement. The patient voiced understanding of the risks, benefits, and alternative forms of treatment that were discussed with Dr Ayala at the time of scheduling. Same day BETH Hamm, APRN  9/1/2022

## 2022-09-01 NOTE — H&P
Patient: Anthony Landers    Date of Admission: 9/9/2022    YOB: 1975    Medical Record Number: [id number]    Admitting Physician: Dr. Edwin Ayala    Reason for Admission: End Stage Right Knee OA    History of Present Illness: 47 y.o. male presents with severe end stage knee osteoarthritis which has not been responsive to the full compliment of conservative measures. Despite conservative attempts, there is still severe, constant activity limiting pain. Given the severity of the pain, the patient has elected to proceed with knee replacement.    Allergies:   Allergies   Allergen Reactions   • Propranolol Shortness Of Breath         Current Medications:  Home Medications:    Current Outpatient Medications on File Prior to Visit   Medication Sig   • allopurinol (ZYLOPRIM) 100 MG tablet TAKE 1 TABLET BY MOUTH EVERY DAY   • atorvastatin (LIPITOR) 10 MG tablet TAKE 1 TABLET BY MOUTH EVERY DAY   • Chlorhexidine Gluconate Cloth 2 % pads Apply  topically Take As Directed. PRIOR TO SURGERY   • diclofenac (VOLTAREN) 50 MG EC tablet TAKE 1 TABLET BY MOUTH TWICE A DAY (Patient taking differently: HOLD PRIOR TO SURGERY PER MD INSTRUCTIONS)   • gabapentin (NEURONTIN) 300 MG capsule TAKE 1 CAPSULE BY MOUTH EVERY DAY (Patient taking differently: Every Evening.)   • hyoscyamine (LEVBID) 0.375 MG 12 hr tablet Take 1 tablet by mouth 3 (Three) Times a Day As Needed for Cramping or Diarrhea. (Patient taking differently: Take 0.375 mg by mouth Every 12 (Twelve) Hours As Needed for Cramping or Diarrhea.)   • irbesartan (AVAPRO) 150 MG tablet TAKE 1 TABLET BY MOUTH EVERY DAY AT NIGHT   • omeprazole (priLOSEC) 40 MG capsule Take 1 capsule by mouth Daily.     No current facility-administered medications on file prior to visit.     PRN Meds:.    PMH:     Past Medical History:   Diagnosis Date   • Asthma    • GERD (gastroesophageal reflux disease)    • Gout    • Hand, foot and mouth disease 09/2021   • Hyperlipidemia    • Hypertension  "   • Irritable bowel syndrome    • Needle phobia    • Peripheral neuropathy    • Primary osteoarthritis of right knee        PF/Surg/Soc Hx:     Past Surgical History:   Procedure Laterality Date   • COLONOSCOPY  05/21/2014    ih   • LACERATION REPAIR Right 1989    UPPER EYE   • UPPER GASTROINTESTINAL ENDOSCOPY  05/21/2014    nml        Social History     Occupational History   • Not on file   Tobacco Use   • Smoking status: Never Smoker   • Smokeless tobacco: Never Used   Vaping Use   • Vaping Use: Never used   Substance and Sexual Activity   • Alcohol use: No   • Drug use: No   • Sexual activity: Defer      Social History     Social History Narrative   • Not on file        Family History   Problem Relation Age of Onset   • Aortic stenosis Father    • Febrile seizures Father    • Malig Hyperthermia Neg Hx          Review of Systems:   A 14 point review of systems was performed, pertinent positives discussed above, all other systems are negative    Physical Exam: 47 y.o. male  Vital Signs :   Vitals:    09/01/22 1347   BP: 122/78   BP Location: Left arm   Patient Position: Sitting   Cuff Size: Large Adult   Temp: 97.1 °F (36.2 °C)   TempSrc: Temporal   Weight: 94.2 kg (207 lb 9.6 oz)   Height: 154.9 cm (60.98\")     General: Alert and Oriented x 3, No acute distress.  Psych: mood and affect appropriate; recent and remote memory intact  Eyes: conjunctiva clear; pupils equally round and reactive, sclera nonicteric  CV: no peripheral edema  Resp: normal respiratory effort  Skin: no rashes or wounds; normal turgor  Musculosketetal; pain and crepitance with knee range of motion  Vascular: palpable distal pulses    Xrays:  -3 views (AP, lateral, and sunrise) were reviewed demonstrating end-stage OA with bone on bone articulation.  -A full length AP xray was ordered and reviewed today for purposes of operative alignment demonstrating end stage arthritic findings. There are no previous full length films for " review    Assessment:  End-stage Right knee osteoarthritis. Conservative measures have failed.      Plan:  The plan is to proceed with Right Total Knee Replacement. The patient voiced understanding of the risks, benefits, and alternative forms of treatment that were discussed with Dr Ayala at the time of scheduling. Same day BETH Hamm, APRN  9/1/2022

## 2022-09-08 ENCOUNTER — TELEPHONE (OUTPATIENT)
Dept: ORTHOPEDIC SURGERY | Facility: CLINIC | Age: 47
End: 2022-09-08

## 2022-09-08 NOTE — TELEPHONE ENCOUNTER
Patient is scheduled for right knee surgery 9/9/22. I have tried to contact the patient, but got the patient's voice mail.  I have left a few voice mails throughout the day about his arrival time for his surgery on 9/9/22 @ 5:15 a.m. I left voice mails asking the patient to please call me to let me know and confirm that he's aware of his arrival time for his surgery tomorrow morning 9/9/22 @ 5:15 a.m.

## 2022-09-12 ENCOUNTER — PREP FOR SURGERY (OUTPATIENT)
Dept: OTHER | Facility: HOSPITAL | Age: 47
End: 2022-09-12

## 2022-09-12 DIAGNOSIS — M17.11 PRIMARY OSTEOARTHRITIS OF RIGHT KNEE: Primary | ICD-10-CM

## 2022-09-12 RX ORDER — CHLORHEXIDINE GLUCONATE 500 MG/1
CLOTH TOPICAL 2 TIMES DAILY
Status: CANCELLED | OUTPATIENT
Start: 2022-09-12

## 2022-09-12 RX ORDER — MELOXICAM 15 MG/1
15 TABLET ORAL ONCE
Status: CANCELLED | OUTPATIENT
Start: 2022-09-19 | End: 2022-09-12

## 2022-09-12 RX ORDER — POVIDONE-IODINE 10 MG/ML
SOLUTION TOPICAL ONCE
Status: CANCELLED | OUTPATIENT
Start: 2022-09-19 | End: 2022-09-12

## 2022-09-12 RX ORDER — CEFAZOLIN SODIUM 2 G/100ML
2 INJECTION, SOLUTION INTRAVENOUS ONCE
Status: CANCELLED | OUTPATIENT
Start: 2022-09-19 | End: 2022-09-12

## 2022-09-12 RX ORDER — PREGABALIN 75 MG/1
150 CAPSULE ORAL ONCE
Status: CANCELLED | OUTPATIENT
Start: 2022-09-19 | End: 2022-09-12

## 2022-09-16 NOTE — DISCHARGE PLACEMENT REQUEST
"Vic Landers (47 y.o. Male)             Date of Birth   1975    Social Security Number       Address   [address] [place] [address]    Home Phone   [phone]    MRN   [id number]       Spiritism   Mandaen    Marital Status   Single                            Admission Date       Admission Type   Elective    Admitting Provider   Edwin Ayala MD    Attending Provider   Edwin Ayala MD    Department, Room/Bed   [facility], --/--       Discharge Date       Discharge Disposition       Discharge Destination                               Attending Provider: Edwin Ayala MD    Allergies: Propranolol    Isolation: None   Infection: None   Code Status: Not on file   Advance Care Planning Activity    Ht: 154.9 cm (60.98\")   Wt: 94.2 kg (207 lb 9.6 oz)    Admission Cmt: None   Principal Problem: OA (osteoarthritis) of knee [M17.10]                 Active Insurance as of 9/19/2022     Primary Coverage     Payor Plan Insurance Group Employer/Plan Group    [employer] BLUE [employer]OSS [employer] 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

## 2022-09-19 ENCOUNTER — APPOINTMENT (OUTPATIENT)
Dept: GENERAL RADIOLOGY | Facility: HOSPITAL | Age: 47
End: 2022-09-19

## 2022-09-19 ENCOUNTER — READMISSION MANAGEMENT (OUTPATIENT)
Dept: CALL CENTER | Facility: HOSPITAL | Age: 47
End: 2022-09-19

## 2022-09-19 ENCOUNTER — HOSPITAL ENCOUNTER (OUTPATIENT)
Facility: HOSPITAL | Age: 47
Discharge: HOME HEALTH | End: 2022-09-19
Attending: ORTHOPAEDIC SURGERY | Admitting: ORTHOPAEDIC SURGERY

## 2022-09-19 ENCOUNTER — ANESTHESIA (OUTPATIENT)
Dept: PERIOP | Facility: HOSPITAL | Age: 47
End: 2022-09-19

## 2022-09-19 ENCOUNTER — ANESTHESIA EVENT (OUTPATIENT)
Dept: PERIOP | Facility: HOSPITAL | Age: 47
End: 2022-09-19

## 2022-09-19 VITALS
RESPIRATION RATE: 18 BRPM | HEART RATE: 93 BPM | DIASTOLIC BLOOD PRESSURE: 81 MMHG | SYSTOLIC BLOOD PRESSURE: 131 MMHG | OXYGEN SATURATION: 98 % | TEMPERATURE: 98.5 F

## 2022-09-19 DIAGNOSIS — M17.11 PRIMARY OSTEOARTHRITIS OF RIGHT KNEE: ICD-10-CM

## 2022-09-19 DIAGNOSIS — Z98.890 S/P KNEE SURGERY: Primary | ICD-10-CM

## 2022-09-19 PROCEDURE — 25010000002 DEXAMETHASONE PER 1 MG: Performed by: STUDENT IN AN ORGANIZED HEALTH CARE EDUCATION/TRAINING PROGRAM

## 2022-09-19 PROCEDURE — 27447 TOTAL KNEE ARTHROPLASTY: CPT | Performed by: ORTHOPAEDIC SURGERY

## 2022-09-19 PROCEDURE — 25010000002 ONDANSETRON PER 1 MG: Performed by: NURSE ANESTHETIST, CERTIFIED REGISTERED

## 2022-09-19 PROCEDURE — 25010000002 ROPIVACAINE PER 1 MG: Performed by: ORTHOPAEDIC SURGERY

## 2022-09-19 PROCEDURE — 97161 PT EVAL LOW COMPLEX 20 MIN: CPT

## 2022-09-19 PROCEDURE — 25010000002 NEOSTIGMINE 5 MG/10ML SOLUTION: Performed by: NURSE ANESTHETIST, CERTIFIED REGISTERED

## 2022-09-19 PROCEDURE — 25010000002 VANCOMYCIN 10 G RECONSTITUTED SOLUTION: Performed by: ORTHOPAEDIC SURGERY

## 2022-09-19 PROCEDURE — 25010000002 CEFAZOLIN IN DEXTROSE 2-4 GM/100ML-% SOLUTION: Performed by: ORTHOPAEDIC SURGERY

## 2022-09-19 PROCEDURE — 25010000002 MIDAZOLAM PER 1 MG: Performed by: STUDENT IN AN ORGANIZED HEALTH CARE EDUCATION/TRAINING PROGRAM

## 2022-09-19 PROCEDURE — 25010000002 FENTANYL CITRATE (PF) 50 MCG/ML SOLUTION: Performed by: STUDENT IN AN ORGANIZED HEALTH CARE EDUCATION/TRAINING PROGRAM

## 2022-09-19 PROCEDURE — 76942 ECHO GUIDE FOR BIOPSY: CPT | Performed by: ORTHOPAEDIC SURGERY

## 2022-09-19 PROCEDURE — 97110 THERAPEUTIC EXERCISES: CPT

## 2022-09-19 PROCEDURE — 25010000002 EPINEPHRINE 1 MG/ML SOLUTION 30 ML VIAL: Performed by: ORTHOPAEDIC SURGERY

## 2022-09-19 PROCEDURE — C1776 JOINT DEVICE (IMPLANTABLE): HCPCS | Performed by: ORTHOPAEDIC SURGERY

## 2022-09-19 PROCEDURE — 25010000002 PROPOFOL 10 MG/ML EMULSION: Performed by: NURSE ANESTHETIST, CERTIFIED REGISTERED

## 2022-09-19 PROCEDURE — 25010000002 MORPHINE PER 10 MG: Performed by: ORTHOPAEDIC SURGERY

## 2022-09-19 PROCEDURE — G0378 HOSPITAL OBSERVATION PER HR: HCPCS

## 2022-09-19 PROCEDURE — C1713 ANCHOR/SCREW BN/BN,TIS/BN: HCPCS | Performed by: ORTHOPAEDIC SURGERY

## 2022-09-19 PROCEDURE — 25010000002 FENTANYL CITRATE (PF) 100 MCG/2ML SOLUTION: Performed by: NURSE ANESTHETIST, CERTIFIED REGISTERED

## 2022-09-19 PROCEDURE — 25010000002 ROPIVACAINE PER 1 MG: Performed by: STUDENT IN AN ORGANIZED HEALTH CARE EDUCATION/TRAINING PROGRAM

## 2022-09-19 PROCEDURE — 73560 X-RAY EXAM OF KNEE 1 OR 2: CPT

## 2022-09-19 PROCEDURE — 25010000002 KETOROLAC TROMETHAMINE PER 15 MG: Performed by: ORTHOPAEDIC SURGERY

## 2022-09-19 PROCEDURE — 25010000002 DEXAMETHASONE SODIUM PHOSPHATE 20 MG/5ML SOLUTION: Performed by: NURSE ANESTHETIST, CERTIFIED REGISTERED

## 2022-09-19 PROCEDURE — 27447 TOTAL KNEE ARTHROPLASTY: CPT | Performed by: NURSE PRACTITIONER

## 2022-09-19 DEVICE — IMPLANTABLE DEVICE: Type: IMPLANTABLE DEVICE | Site: KNEE | Status: FUNCTIONAL

## 2022-09-19 DEVICE — GENESIS II NON-POROUS TIBIAL                                    BASEPLATE SIZE 7 RIGHT
Type: IMPLANTABLE DEVICE | Site: KNEE | Status: FUNCTIONAL
Brand: GENESIS II

## 2022-09-19 DEVICE — GEN II RESURFACING PATELLA 38MM
Type: IMPLANTABLE DEVICE | Site: KNEE | Status: FUNCTIONAL
Brand: GENESIS II

## 2022-09-19 DEVICE — LEGION CRUCIATE RETAINING OXINIUM                                    FEMORAL SIZE 6 RIGHT
Type: IMPLANTABLE DEVICE | Site: KNEE | Status: FUNCTIONAL
Brand: LEGION

## 2022-09-19 DEVICE — KNOTLESS TISSUE CONTROL DEVICE, UNDYED UNIDIRECTIONAL (ANTIBACTERIAL) SYNTHETIC ABSORBABLE DEVICE
Type: IMPLANTABLE DEVICE | Site: KNEE | Status: FUNCTIONAL
Brand: STRATAFIX

## 2022-09-19 DEVICE — VIOLET ANTIBACTERIAL POLYDIOXANONE, KNOTLESS TISSUE CONTROL DEVICE
Type: IMPLANTABLE DEVICE | Site: KNEE | Status: FUNCTIONAL
Brand: STRATAFIX

## 2022-09-19 DEVICE — LEGION CR HIGH FLEX XLPE SZ 7-8 10MM
Type: IMPLANTABLE DEVICE | Site: KNEE | Status: FUNCTIONAL
Brand: LEGION

## 2022-09-19 DEVICE — CMT BONE PALACOS R HI/VISC 1X40: Type: IMPLANTABLE DEVICE | Site: KNEE | Status: FUNCTIONAL

## 2022-09-19 RX ORDER — ONDANSETRON 2 MG/ML
4 INJECTION INTRAMUSCULAR; INTRAVENOUS ONCE AS NEEDED
Status: DISCONTINUED | OUTPATIENT
Start: 2022-09-19 | End: 2022-09-19 | Stop reason: HOSPADM

## 2022-09-19 RX ORDER — LOSARTAN POTASSIUM 50 MG/1
50 TABLET ORAL
Refills: 5 | Status: CANCELLED | OUTPATIENT
Start: 2022-09-19

## 2022-09-19 RX ORDER — DEXAMETHASONE SODIUM PHOSPHATE 4 MG/ML
INJECTION, SOLUTION INTRA-ARTICULAR; INTRALESIONAL; INTRAMUSCULAR; INTRAVENOUS; SOFT TISSUE
Status: COMPLETED | OUTPATIENT
Start: 2022-09-19 | End: 2022-09-19

## 2022-09-19 RX ORDER — HYDROMORPHONE HYDROCHLORIDE 1 MG/ML
0.5 INJECTION, SOLUTION INTRAMUSCULAR; INTRAVENOUS; SUBCUTANEOUS
Status: DISCONTINUED | OUTPATIENT
Start: 2022-09-19 | End: 2022-09-19 | Stop reason: HOSPADM

## 2022-09-19 RX ORDER — ASPIRIN 81 MG/1
81 TABLET ORAL EVERY 12 HOURS SCHEDULED
Status: DISCONTINUED | OUTPATIENT
Start: 2022-09-20 | End: 2022-09-19 | Stop reason: HOSPADM

## 2022-09-19 RX ORDER — ROPIVACAINE HYDROCHLORIDE 5 MG/ML
INJECTION, SOLUTION EPIDURAL; INFILTRATION; PERINEURAL
Status: COMPLETED | OUTPATIENT
Start: 2022-09-19 | End: 2022-09-19

## 2022-09-19 RX ORDER — MELOXICAM 15 MG/1
15 TABLET ORAL ONCE
Status: COMPLETED | OUTPATIENT
Start: 2022-09-19 | End: 2022-09-19

## 2022-09-19 RX ORDER — SODIUM CHLORIDE 0.9 % (FLUSH) 0.9 %
3-10 SYRINGE (ML) INJECTION AS NEEDED
Status: DISCONTINUED | OUTPATIENT
Start: 2022-09-19 | End: 2022-09-19 | Stop reason: HOSPADM

## 2022-09-19 RX ORDER — GABAPENTIN 300 MG/1
300 CAPSULE ORAL NIGHTLY
Status: CANCELLED | OUTPATIENT
Start: 2022-09-19

## 2022-09-19 RX ORDER — PROMETHAZINE HYDROCHLORIDE 25 MG/1
25 SUPPOSITORY RECTAL ONCE AS NEEDED
Status: DISCONTINUED | OUTPATIENT
Start: 2022-09-19 | End: 2022-09-19 | Stop reason: HOSPADM

## 2022-09-19 RX ORDER — ASPIRIN 81 MG/1
TABLET ORAL
Qty: 60 TABLET | Refills: 0 | Status: SHIPPED | OUTPATIENT
Start: 2022-09-19

## 2022-09-19 RX ORDER — ONDANSETRON 2 MG/ML
INJECTION INTRAMUSCULAR; INTRAVENOUS AS NEEDED
Status: DISCONTINUED | OUTPATIENT
Start: 2022-09-19 | End: 2022-09-19 | Stop reason: SURG

## 2022-09-19 RX ORDER — LIDOCAINE HYDROCHLORIDE 10 MG/ML
0.5 INJECTION, SOLUTION EPIDURAL; INFILTRATION; INTRACAUDAL; PERINEURAL ONCE AS NEEDED
Status: DISCONTINUED | OUTPATIENT
Start: 2022-09-19 | End: 2022-09-19 | Stop reason: HOSPADM

## 2022-09-19 RX ORDER — FENTANYL CITRATE 50 UG/ML
50 INJECTION, SOLUTION INTRAMUSCULAR; INTRAVENOUS
Status: DISCONTINUED | OUTPATIENT
Start: 2022-09-19 | End: 2022-09-19 | Stop reason: HOSPADM

## 2022-09-19 RX ORDER — DIPHENHYDRAMINE HCL 25 MG
25 CAPSULE ORAL
Status: DISCONTINUED | OUTPATIENT
Start: 2022-09-19 | End: 2022-09-19 | Stop reason: HOSPADM

## 2022-09-19 RX ORDER — SODIUM CHLORIDE, SODIUM LACTATE, POTASSIUM CHLORIDE, CALCIUM CHLORIDE 600; 310; 30; 20 MG/100ML; MG/100ML; MG/100ML; MG/100ML
9 INJECTION, SOLUTION INTRAVENOUS CONTINUOUS
Status: DISCONTINUED | OUTPATIENT
Start: 2022-09-19 | End: 2022-09-19 | Stop reason: HOSPADM

## 2022-09-19 RX ORDER — DEXAMETHASONE SODIUM PHOSPHATE 4 MG/ML
INJECTION, SOLUTION INTRA-ARTICULAR; INTRALESIONAL; INTRAMUSCULAR; INTRAVENOUS; SOFT TISSUE AS NEEDED
Status: DISCONTINUED | OUTPATIENT
Start: 2022-09-19 | End: 2022-09-19 | Stop reason: SURG

## 2022-09-19 RX ORDER — TRANEXAMIC ACID 100 MG/ML
INJECTION, SOLUTION INTRAVENOUS AS NEEDED
Status: DISCONTINUED | OUTPATIENT
Start: 2022-09-19 | End: 2022-09-19 | Stop reason: SURG

## 2022-09-19 RX ORDER — EPHEDRINE SULFATE 50 MG/ML
5 INJECTION, SOLUTION INTRAVENOUS ONCE AS NEEDED
Status: DISCONTINUED | OUTPATIENT
Start: 2022-09-19 | End: 2022-09-19 | Stop reason: HOSPADM

## 2022-09-19 RX ORDER — PREGABALIN 75 MG/1
150 CAPSULE ORAL ONCE
Status: COMPLETED | OUTPATIENT
Start: 2022-09-19 | End: 2022-09-19

## 2022-09-19 RX ORDER — NEOSTIGMINE METHYLSULFATE 0.5 MG/ML
INJECTION, SOLUTION INTRAVENOUS AS NEEDED
Status: DISCONTINUED | OUTPATIENT
Start: 2022-09-19 | End: 2022-09-19 | Stop reason: SURG

## 2022-09-19 RX ORDER — HYDROCODONE BITARTRATE AND ACETAMINOPHEN 7.5; 325 MG/1; MG/1
TABLET ORAL
Qty: 42 TABLET | Refills: 0 | Status: SHIPPED | OUTPATIENT
Start: 2022-09-19 | End: 2022-10-04

## 2022-09-19 RX ORDER — HYDROCODONE BITARTRATE AND ACETAMINOPHEN 7.5; 325 MG/1; MG/1
1 TABLET ORAL EVERY 4 HOURS PRN
Status: DISCONTINUED | OUTPATIENT
Start: 2022-09-19 | End: 2022-09-19 | Stop reason: HOSPADM

## 2022-09-19 RX ORDER — POLYETHYLENE GLYCOL 3350 17 G/17G
17 POWDER, FOR SOLUTION ORAL 2 TIMES DAILY
Qty: 238 G | Refills: 0 | Status: SHIPPED | OUTPATIENT
Start: 2022-09-19 | End: 2022-09-26

## 2022-09-19 RX ORDER — MIDAZOLAM HYDROCHLORIDE 1 MG/ML
1 INJECTION INTRAMUSCULAR; INTRAVENOUS
Status: DISCONTINUED | OUTPATIENT
Start: 2022-09-19 | End: 2022-09-19 | Stop reason: HOSPADM

## 2022-09-19 RX ORDER — PROMETHAZINE HYDROCHLORIDE 25 MG/1
25 TABLET ORAL ONCE AS NEEDED
Status: DISCONTINUED | OUTPATIENT
Start: 2022-09-19 | End: 2022-09-19 | Stop reason: HOSPADM

## 2022-09-19 RX ORDER — OXYCODONE AND ACETAMINOPHEN 7.5; 325 MG/1; MG/1
1 TABLET ORAL EVERY 4 HOURS PRN
Status: DISCONTINUED | OUTPATIENT
Start: 2022-09-19 | End: 2022-09-19 | Stop reason: HOSPADM

## 2022-09-19 RX ORDER — FENTANYL CITRATE 50 UG/ML
INJECTION, SOLUTION INTRAMUSCULAR; INTRAVENOUS AS NEEDED
Status: DISCONTINUED | OUTPATIENT
Start: 2022-09-19 | End: 2022-09-19 | Stop reason: SURG

## 2022-09-19 RX ORDER — HYDRALAZINE HYDROCHLORIDE 20 MG/ML
5 INJECTION INTRAMUSCULAR; INTRAVENOUS
Status: DISCONTINUED | OUTPATIENT
Start: 2022-09-19 | End: 2022-09-19 | Stop reason: HOSPADM

## 2022-09-19 RX ORDER — MAGNESIUM HYDROXIDE 1200 MG/15ML
LIQUID ORAL AS NEEDED
Status: DISCONTINUED | OUTPATIENT
Start: 2022-09-19 | End: 2022-09-19 | Stop reason: HOSPADM

## 2022-09-19 RX ORDER — MELOXICAM 15 MG/1
15 TABLET ORAL DAILY
Qty: 14 TABLET | Refills: 0 | Status: SHIPPED | OUTPATIENT
Start: 2022-09-19 | End: 2022-10-04

## 2022-09-19 RX ORDER — DIPHENHYDRAMINE HYDROCHLORIDE 50 MG/ML
12.5 INJECTION INTRAMUSCULAR; INTRAVENOUS
Status: DISCONTINUED | OUTPATIENT
Start: 2022-09-19 | End: 2022-09-19 | Stop reason: HOSPADM

## 2022-09-19 RX ORDER — ONDANSETRON 4 MG/1
4 TABLET, FILM COATED ORAL EVERY 8 HOURS PRN
Qty: 10 TABLET | Refills: 0 | Status: SHIPPED | OUTPATIENT
Start: 2022-09-19 | End: 2022-10-04

## 2022-09-19 RX ORDER — POVIDONE-IODINE 10 MG/ML
SOLUTION TOPICAL ONCE
Status: COMPLETED | OUTPATIENT
Start: 2022-09-19 | End: 2022-09-19

## 2022-09-19 RX ORDER — ACETAMINOPHEN 325 MG/1
650 TABLET ORAL EVERY 6 HOURS PRN
Status: DISCONTINUED | OUTPATIENT
Start: 2022-09-19 | End: 2022-09-19 | Stop reason: HOSPADM

## 2022-09-19 RX ORDER — UREA 10 %
1 LOTION (ML) TOPICAL NIGHTLY PRN
Status: DISCONTINUED | OUTPATIENT
Start: 2022-09-19 | End: 2022-09-19 | Stop reason: HOSPADM

## 2022-09-19 RX ORDER — HYDROCODONE BITARTRATE AND ACETAMINOPHEN 7.5; 325 MG/1; MG/1
1 TABLET ORAL ONCE AS NEEDED
Status: COMPLETED | OUTPATIENT
Start: 2022-09-19 | End: 2022-09-19

## 2022-09-19 RX ORDER — ONDANSETRON 4 MG/1
4 TABLET, FILM COATED ORAL EVERY 6 HOURS PRN
Status: DISCONTINUED | OUTPATIENT
Start: 2022-09-19 | End: 2022-09-19 | Stop reason: HOSPADM

## 2022-09-19 RX ORDER — PROMETHAZINE HYDROCHLORIDE 25 MG/1
12.5 TABLET ORAL EVERY 4 HOURS PRN
Status: DISCONTINUED | OUTPATIENT
Start: 2022-09-19 | End: 2022-09-19 | Stop reason: HOSPADM

## 2022-09-19 RX ORDER — NALOXONE HCL 0.4 MG/ML
0.2 VIAL (ML) INJECTION AS NEEDED
Status: DISCONTINUED | OUTPATIENT
Start: 2022-09-19 | End: 2022-09-19 | Stop reason: HOSPADM

## 2022-09-19 RX ORDER — CEFAZOLIN SODIUM 2 G/100ML
2 INJECTION, SOLUTION INTRAVENOUS EVERY 8 HOURS
Status: DISCONTINUED | OUTPATIENT
Start: 2022-09-19 | End: 2022-09-19 | Stop reason: HOSPADM

## 2022-09-19 RX ORDER — GLYCOPYRROLATE 0.2 MG/ML
INJECTION INTRAMUSCULAR; INTRAVENOUS AS NEEDED
Status: DISCONTINUED | OUTPATIENT
Start: 2022-09-19 | End: 2022-09-19 | Stop reason: SURG

## 2022-09-19 RX ORDER — ROCURONIUM BROMIDE 10 MG/ML
INJECTION, SOLUTION INTRAVENOUS AS NEEDED
Status: DISCONTINUED | OUTPATIENT
Start: 2022-09-19 | End: 2022-09-19 | Stop reason: SURG

## 2022-09-19 RX ORDER — SODIUM CHLORIDE 0.9 % (FLUSH) 0.9 %
3 SYRINGE (ML) INJECTION EVERY 12 HOURS SCHEDULED
Status: DISCONTINUED | OUTPATIENT
Start: 2022-09-19 | End: 2022-09-19 | Stop reason: HOSPADM

## 2022-09-19 RX ORDER — LABETALOL HYDROCHLORIDE 5 MG/ML
5 INJECTION, SOLUTION INTRAVENOUS
Status: DISCONTINUED | OUTPATIENT
Start: 2022-09-19 | End: 2022-09-19 | Stop reason: HOSPADM

## 2022-09-19 RX ORDER — ACETAMINOPHEN 10 MG/ML
INJECTION, SOLUTION INTRAVENOUS AS NEEDED
Status: DISCONTINUED | OUTPATIENT
Start: 2022-09-19 | End: 2022-09-19 | Stop reason: SURG

## 2022-09-19 RX ORDER — CEFAZOLIN SODIUM 2 G/100ML
2 INJECTION, SOLUTION INTRAVENOUS ONCE
Status: COMPLETED | OUTPATIENT
Start: 2022-09-19 | End: 2022-09-19

## 2022-09-19 RX ORDER — PROPOFOL 10 MG/ML
VIAL (ML) INTRAVENOUS AS NEEDED
Status: DISCONTINUED | OUTPATIENT
Start: 2022-09-19 | End: 2022-09-19 | Stop reason: SURG

## 2022-09-19 RX ORDER — HYDROCODONE BITARTRATE AND ACETAMINOPHEN 7.5; 325 MG/1; MG/1
2 TABLET ORAL EVERY 4 HOURS PRN
Status: DISCONTINUED | OUTPATIENT
Start: 2022-09-19 | End: 2022-09-19 | Stop reason: HOSPADM

## 2022-09-19 RX ADMIN — ROPIVACAINE HYDROCHLORIDE 24 ML: 5 INJECTION, SOLUTION EPIDURAL; INFILTRATION; PERINEURAL at 12:42

## 2022-09-19 RX ADMIN — POVIDONE-IODINE 4 APPLICATION: 10 SOLUTION TOPICAL at 11:48

## 2022-09-19 RX ADMIN — DEXAMETHASONE SODIUM PHOSPHATE 4 MG: 4 INJECTION, SOLUTION INTRAMUSCULAR; INTRAVENOUS at 12:42

## 2022-09-19 RX ADMIN — TRANEXAMIC ACID 1000 MG: 1 INJECTION, SOLUTION INTRAVENOUS at 14:27

## 2022-09-19 RX ADMIN — TRANEXAMIC ACID 1000 MG: 1 INJECTION, SOLUTION INTRAVENOUS at 15:56

## 2022-09-19 RX ADMIN — SODIUM CHLORIDE, POTASSIUM CHLORIDE, SODIUM LACTATE AND CALCIUM CHLORIDE 500 ML: 600; 310; 30; 20 INJECTION, SOLUTION INTRAVENOUS at 12:04

## 2022-09-19 RX ADMIN — PROPOFOL 150 MG: 10 INJECTION, EMULSION INTRAVENOUS at 14:09

## 2022-09-19 RX ADMIN — ACETAMINOPHEN 1000 MG: 10 INJECTION, SOLUTION INTRAVENOUS at 14:30

## 2022-09-19 RX ADMIN — ROCURONIUM BROMIDE 20 MG: 10 INJECTION, SOLUTION INTRAVENOUS at 14:55

## 2022-09-19 RX ADMIN — GLYCOPYRROLATE 0.4 MG: 0.2 INJECTION INTRAMUSCULAR; INTRAVENOUS at 16:04

## 2022-09-19 RX ADMIN — NEOSTIGMINE METHYLSULFATE 2.5 MG: 0.5 INJECTION INTRAVENOUS at 16:05

## 2022-09-19 RX ADMIN — ROCURONIUM BROMIDE 40 MG: 10 INJECTION, SOLUTION INTRAVENOUS at 14:09

## 2022-09-19 RX ADMIN — FENTANYL CITRATE 100 MCG: 50 INJECTION, SOLUTION INTRAMUSCULAR; INTRAVENOUS at 14:09

## 2022-09-19 RX ADMIN — PROPOFOL 50 MG: 10 INJECTION, EMULSION INTRAVENOUS at 14:11

## 2022-09-19 RX ADMIN — VANCOMYCIN HYDROCHLORIDE 1500 MG: 10 INJECTION, POWDER, LYOPHILIZED, FOR SOLUTION INTRAVENOUS at 12:43

## 2022-09-19 RX ADMIN — ONDANSETRON 4 MG: 2 INJECTION INTRAMUSCULAR; INTRAVENOUS at 15:46

## 2022-09-19 RX ADMIN — MELOXICAM 15 MG: 15 TABLET ORAL at 11:47

## 2022-09-19 RX ADMIN — ROCURONIUM BROMIDE 10 MG: 10 INJECTION, SOLUTION INTRAVENOUS at 15:03

## 2022-09-19 RX ADMIN — FENTANYL CITRATE 50 MCG: 50 INJECTION INTRAMUSCULAR; INTRAVENOUS at 12:34

## 2022-09-19 RX ADMIN — HYDROCODONE BITARTRATE AND ACETAMINOPHEN 1 TABLET: 7.5; 325 TABLET ORAL at 16:50

## 2022-09-19 RX ADMIN — SODIUM CHLORIDE, POTASSIUM CHLORIDE, SODIUM LACTATE AND CALCIUM CHLORIDE: 600; 310; 30; 20 INJECTION, SOLUTION INTRAVENOUS at 15:34

## 2022-09-19 RX ADMIN — PREGABALIN 150 MG: 75 CAPSULE ORAL at 11:47

## 2022-09-19 RX ADMIN — ROCURONIUM BROMIDE 10 MG: 10 INJECTION, SOLUTION INTRAVENOUS at 14:11

## 2022-09-19 RX ADMIN — MIDAZOLAM HYDROCHLORIDE 1 MG: 1 INJECTION, SOLUTION INTRAMUSCULAR; INTRAVENOUS at 12:34

## 2022-09-19 RX ADMIN — DEXAMETHASONE SODIUM PHOSPHATE 8 MG: 4 INJECTION, SOLUTION INTRAMUSCULAR; INTRAVENOUS at 14:25

## 2022-09-19 RX ADMIN — CEFAZOLIN SODIUM 2 G: 2 INJECTION, SOLUTION INTRAVENOUS at 13:56

## 2022-09-19 NOTE — PLAN OF CARE
Goal Outcome Evaluation:    Patient is a pleasant 47 y.o. male POD1 R TKA with expected post op weakness and impaired functional mobility. Patient is independent at baseline and has access to all equipment. Today, patient UIC, required SV for transfers and ambulated SV with use of RW. Reviewed stairs verbally. Based on current clinical presentation, patient okay to DC home today with assist and  PT to follow up.

## 2022-09-19 NOTE — ADDENDUM NOTE
Addendum  created 09/19/22 1827 by Oscar Escobar MD    Attestation recorded in Intraprocedure, Intraprocedure Attestations filed

## 2022-09-19 NOTE — ANESTHESIA PREPROCEDURE EVALUATION
Anesthesia Evaluation     Patient summary reviewed and Nursing notes reviewed   no history of anesthetic complications:  NPO Solid Status: > 8 hours  NPO Liquid Status: > 2 hours           Airway   Mallampati: II  TM distance: >3 FB  Neck ROM: full  Dental      Pulmonary    (+) asthma,sleep apnea,   Cardiovascular     ECG reviewed    (+) hypertension, hyperlipidemia,       Neuro/Psych  GI/Hepatic/Renal/Endo    (+) obesity,  GERD,      Musculoskeletal     Abdominal    Substance History      OB/GYN          Other   arthritis,                      Anesthesia Plan    ASA 3     general     intravenous induction     Anesthetic plan, risks, benefits, and alternatives have been provided, discussed and informed consent has been obtained with: patient.        CODE STATUS:

## 2022-09-19 NOTE — CASE MANAGEMENT/SOCIAL WORK
Continued Stay Note  [facility]     Patient Name: Anthony Landers  MRN: [id number]  Today's Date: 9/19/2022    Admit Date: 9/19/2022     Discharge Plan     Row Name 09/19/22 1129       Plan    Plan Home with [facility]               Discharge Codes    No documentation.                     Shannon Epley, RN

## 2022-09-19 NOTE — ANESTHESIA PROCEDURE NOTES
Airway  Urgency: elective    Date/Time: 9/19/2022 2:14 PM  Airway not difficult    General Information and Staff    Patient location during procedure: OR  CRNA/CAA: Yaneth Barnard CRNA    Indications and Patient Condition  Indications for airway management: airway protection    Preoxygenated: yes  Mask difficulty assessment: 1 - vent by mask    Final Airway Details  Final airway type: endotracheal airway      Successful airway: ETT  Cuffed: yes   Successful intubation technique: direct laryngoscopy  Endotracheal tube insertion site: oral  Blade: Mary  Blade size: 4  ETT size (mm): 7.5  Cormack-Lehane Classification: grade I - full view of glottis  Placement verified by: chest auscultation and capnometry   Measured from: lips  ETT/EBT  to lips (cm): 22  Number of attempts at approach: 1  Assessment: lips, teeth, and gum same as pre-op and atraumatic intubation    Additional Comments   ett cuff up at MOP

## 2022-09-19 NOTE — THERAPY EVALUATION
Patient Name: Anthony Landers  : 1975    MRN: [id number]                              Today's Date: 2022       Admit Date: 2022    Visit Dx:     ICD-10-CM ICD-9-CM   1. S/P knee surgery  Z98.890 V45.89   2. Primary osteoarthritis of right knee  M17.11 715.16     Patient Active Problem List   Diagnosis   • Atopic rhinitis   • Essential hypertension   • Gout   • Hyperlipidemia   • Peripheral neuropathy   • Sciatica   • Snoring   • GERD (gastroesophageal reflux disease)   • Irritable bowel syndrome with diarrhea   • Sleep apnea   • Numbness in feet   • OA (osteoarthritis) of knee     Past Medical History:   Diagnosis Date   • Asthma    • GERD (gastroesophageal reflux disease)    • Gout    • Hand, foot and mouth disease 2021   • Hyperlipidemia    • Hypertension    • Irritable bowel syndrome    • Needle phobia    • Peripheral neuropathy    • Primary osteoarthritis of right knee      Past Surgical History:   Procedure Laterality Date   • COLONOSCOPY  2014    ih   • LACERATION REPAIR Right 1989    UPPER EYE   • UPPER GASTROINTESTINAL ENDOSCOPY  2014    nml      General Information     Row Name 22 1748          Physical Therapy Time and Intention    Document Type discharge evaluation/summary  -MS     Mode of Treatment physical therapy  -MS     Row Name 22 1748          General Information    Patient Profile Reviewed yes  -MS     Prior Level of Function independent:;all household mobility  -MS     Existing Precautions/Restrictions fall  -MS     Barriers to Rehab none identified  -MS     Row Name 22 1748          Home Main Entrance    Number of Stairs, Main Entrance three  -MS     Stair Railings, Main Entrance railings safe and in good condition  -MS     Row Name 22 1748          Cognition    Orientation Status (Cognition) oriented x 4  -MS     Row Name 22 1741          Safety Issues, Functional Mobility    Safety Issues Affecting Function (Mobility) positioning of  assistive device;sequencing abilities  -MS     Impairments Affecting Function (Mobility) range of motion (ROM);sensation/sensory awareness  -MS     Comment, Safety Issues/Impairments (Mobility) Gait belt and non skid socks donned.  -MS           User Key  (r) = Recorded By, (t) = Taken By, (c) = Cosigned By    Initials Name Provider Type    MS MartinsSarina, PT Physical Therapist               Mobility     Row Name 09/19/22 1748          Bed Mobility    Comment, (Bed Mobility) NT- UIC  -MS     Row Name 09/19/22 1748          Sit-Stand Transfer    Sit-Stand [place] (Transfers) supervision;verbal cues  -MS     Assistive Device (Sit-Stand Transfers) walker, front-wheeled  -MS     Row Name 09/19/22 1748          Gait/Stairs (Locomotion)    [place] Level (Gait) supervision;verbal cues  -MS     Assistive Device (Gait) walker, front-wheeled  -MS     Distance in Feet (Gait) 25'  -MS     Deviations/Abnormal Patterns (Gait) antalgic  -MS     Comment, (Gait/Stairs) Walker placement cues. Reviewed stairs verbally.  -MS           User Key  (r) = Recorded By, (t) = Taken By, (c) = Cosigned By    Initials Name Provider Type    MS Martins Sarina PRASAD, PT Physical Therapist               Obj/Interventions     Row Name 09/19/22 1749          Range of Motion Comprehensive    Comment, General Range of Motion R LE limited 2/2 stiffness  -MS     Row Name 09/19/22 1749          Strength Comprehensive (MMT)    Comment, General Manual Muscle Testing (MMT) Assessment R LE post op weakness  -MS     Row Name 09/19/22 1749          Motor Skills    Therapeutic Exercise --  R TKA protocol x 10 reps except SAQ  -MS           User Key  (r) = Recorded By, (t) = Taken By, (c) = Cosigned By    Initials Name Provider Type    MS MartinsSarina, PT Physical Therapist               Goals/Plan    No documentation.                Clinical Impression     Row Name 09/19/22 1749          Pain    Pretreatment Pain Rating 0/10 - no pain  -MS      Row Name 09/19/22 1749          Therapy Assessment/Plan (PT)    Therapy Frequency (PT) evaluation only  -MS     Row Name 09/19/22 1749          Positioning and Restraints    Pre-Treatment Position sitting in chair/recliner  -MS     Post Treatment Position chair  -MS           User Key  (r) = Recorded By, (t) = Taken By, (c) = Cosigned By    Initials Name Provider Type    MS Martins Sarina PRASAD, PT Physical Therapist               Outcome Measures     Row Name 09/19/22 1749          How much help from another person do you currently need...    Turning from your back to your side while in flat bed without using bedrails? 4  -MS     Moving from lying on back to sitting on the side of a flat bed without bedrails? 4  -MS     Moving to and from a bed to a chair (including a wheelchair)? 3  -MS     Standing up from a chair using your arms (e.g., wheelchair, bedside chair)? 4  -MS     Climbing 3-5 steps with a railing? 3  -MS     To walk in hospital room? 3  -MS     AM-PAC 6 Clicks Score (PT) 21  -MS     Highest level of mobility 6 --> Walked 10 steps or more  -MS     Row Name 09/19/22 1749          Functional Assessment    Outcome Measure Options AM-PAC 6 Clicks Basic Mobility (PT)  -MS           User Key  (r) = Recorded By, (t) = Taken By, (c) = Cosigned By    Initials Name Provider Type    MS MartinsSarina, PT Physical Therapist                               PT Recommendation and Plan           Time Calculation:    PT Charges     Row Name 09/19/22 1747             Time Calculation    Start Time 1730  -MS      Stop Time 1746  -MS      Time Calculation (min) 16 min  -MS      PT Received On 09/19/22  -MS            User Key  (r) = Recorded By, (t) = Taken By, (c) = Cosigned By    Initials Name Provider Type    MS MartinsSarina, PT Physical Therapist              Therapy Charges for Today     Code Description Service Date Service Provider Modifiers Qty    16451468743 HC PT EVAL LOW COMPLEXITY 2 9/19/2022 Lakshmi  Sarina PRASAD, PT GP 1    [id number]  PT THER PROC EA 15 MIN 9/19/2022 Sarina Martins, PT GP 1          PT G-Codes  Outcome Measure Options: AM-PAC 6 Clicks Basic Mobility (PT)  AM-PAC 6 Clicks Score (PT): 21    Sarina Martins, PT  9/19/2022

## 2022-09-19 NOTE — ANESTHESIA PROCEDURE NOTES
Peripheral Block    Pre-sedation assessment completed: 9/19/2022 12:36 PM    Patient reassessed immediately prior to procedure    Patient location during procedure: pre-op  Stop time: 9/19/2022 12:40 PM  Reason for block: at surgeon's request and post-op pain management  Performed by  Anesthesiologist: Nabeel Kc MD  Preanesthetic Checklist  Completed: patient identified, IV checked, site marked, risks and benefits discussed, surgical consent, monitors and equipment checked, pre-op evaluation and timeout performed  Prep:  Pt Position: supine  Sterile barriers:mask, gloves and cap  Prep: ChloraPrep  Patient monitoring: blood pressure monitoring, continuous pulse oximetry and EKG  Procedure    Sedation: yes  Performed under: local infiltration  Guidance:ultrasound guided    ULTRASOUND INTERPRETATION. Using ultrasound guidance a 21 G gauge needle was placed in close proximity to the nerve, at which point, under ultrasound guidance anesthetic was injected in the area of the nerve and spread of the anesthesia was seen on ultrasound in close proximity thereto.  There were no abnormalities seen on ultrasound; a digital image was taken; and the patient tolerated the procedure with no complications. Images:still images obtained, printed/placed on chart    Laterality:right  Block Type:adductor canal block  Injection Technique:single-shot  Needle Type:echogenic and Tuohy  Needle Gauge:21 G  Resistance on Injection: none    Medications Used: ropivacaine (NAROPIN) 0.5 % injection, 24 mL  dexamethasone (DECADRON) injection, 4 mg      Post Assessment  Injection Assessment: negative aspiration for heme, no paresthesia on injection and incremental injection  Patient Tolerance:comfortable throughout block  Complications:no  Additional Notes  Ultrasound guidance used to visualize nerve anatomy, guide needle placement and verify local anesthetic disbursement.

## 2022-09-19 NOTE — ANESTHESIA POSTPROCEDURE EVALUATION
Patient: Anthony Landers    Procedure Summary     Date: 09/19/22 Room / Location:  [facility] [address] [place] [address] [facility]    Anesthesia Start: 1400 Anesthesia Stop: 1619    Procedure: TOTAL KNEE ARTHROPLASTY (Right Knee) Diagnosis:       Primary osteoarthritis of right knee      (Primary osteoarthritis of right knee [M17.11])    Surgeons: Edwin Ayala MD Provider: Oscar Escobar MD    Anesthesia Type: general ASA Status: 3          Anesthesia Type: general    Vitals  Vitals Value Taken Time   /96 09/19/22 1700   Temp 36.9 °C (98.5 °F) 09/19/22 1700   Pulse 86 09/19/22 1714   Resp 18 09/19/22 1700   SpO2 100 % 09/19/22 1714   Vitals shown include unvalidated device data.        Post Anesthesia Care and Evaluation    Patient location during evaluation: PHASE II  Patient participation: complete - patient participated  Level of consciousness: awake  Pain management: satisfactory to patient    Airway patency: patent  Anesthetic complications: No anesthetic complications  PONV Status: controlled  Cardiovascular status: acceptable  Respiratory status: acceptable  Hydration status: acceptable

## 2022-09-20 ENCOUNTER — TRANSITIONAL CARE MANAGEMENT TELEPHONE ENCOUNTER (OUTPATIENT)
Dept: CALL CENTER | Facility: HOSPITAL | Age: 47
End: 2022-09-20

## 2022-09-20 NOTE — OUTREACH NOTE
Prep Survey    Flowsheet Row Responses   [facility] facility patient discharged from? [place]   Is LACE score < 7 ? Yes   Emergency Room discharge w/ pulse ox? No   Eligibility Psychiatric   Date of Admission 09/19/22   Date of Discharge 09/19/22   Discharge Disposition Home-Health Care Sv   Discharge diagnosis right knee total arthroplasty   Does the patient have one of the following disease processes/diagnoses(primary or secondary)? Total Joint Replacement   Does the patient have Home health ordered? Yes   What is the Home health agency?  [facility]    Is there a DME ordered? No   Prep survey completed? Yes          CANDY JUNE - Registered Nurse

## 2022-09-20 NOTE — OP NOTE
Name: Anthony Landers  YOB: 1975    DATE OF SURGERY: 9/19/2022    PREOPERATIVE DIAGNOSIS: Right knee end-stage osteoarthritis    POSTOPERATIVE DIAGNOSIS: Right knee end-stage osteoarthritis    PROCEDURE PERFORMED: Right total knee replacement    SURGEON: Edwin Ayala M.D.    ASSISTANT: EDWAR ANTOINE    A surgical assistant was integral in ensuring a successful outcome with this procedure.  The assistant was utilized to assist in positioning the patient, draping the patient, was used throughout the case to provide with retraction of tissues, suctioning of blood and body fluids for visualization, positioning of the extremity to allow for proper exposure so that I could perform the procedure.  Without the use of a surgical assistant during this procedure I feel that the outcome may have been compromised or would have been suboptimal or at risk for complications.    IMPLANTS: [employer]:     Implant Name Type Inv. Item Serial No.  Lot No. LRB No. Used Action   CMT BONE PALACOS R HI/VISC 1X40 - [id number] Implant CMT BONE PALACOS R HI/VISC 1X40  [facility]  Right 1 Implanted   DEV CONTRL TISS STRATAFIX SYMM PDS PLUS WILBER CT-1 60CM - [id number] Implant DEV CONTRL TISS STRATAFIX SYMM PDS PLUS WILBER CT-1 60CM  [employer]  DIV [employer] J [employer] J SGMHLL Right 1 Implanted   DEV CONTRL TISS STRATAFIXSPIRALMNCRYL PLSPS2 REV3/0 45CM - [id number] Implant DEV CONTRL TISS STRATAFIXSPIRALMNCRYL PLSPS2 REV3/0 45CM  [employer]  DIV [employer] J [employer] J SGBEUR Right 1 Implanted   BASE TIB/KN GEN2 NONPOR TI SZ7 RT - [id number] Implant BASE TIB/KN GEN2 NONPOR TI SZ7 RT  [employer] [id number] Right 1 Implanted   COMP PAT KN RESRF GENESIS2 9X38MM - [id number] Implant COMP PAT KN RESRF GENESIS2 9X38MM  [employer] [id number] Right 1 Implanted   COMP FEM LEGION OXINIUM CR SZ6 RT - [id number] Implant COMP FEM LEGION OXINIUM CR SZ6 RT  [employer] [id number] Right 1 Implanted   INSRT ART/KN LEGION CR HF XLPE  SZ7TO8 10MM - [id number] Implant INSRT ART/KN LEGION CR HF XLPE SZ7TO8 10MM  [employer] [id number] Right 1 Implanted       Estimated Blood Loss: 200cc  Specimens : none  Complications: none    DESCRIPTION OF PROCEDURE: The patient was taken to the operating room and placed in the supine position. A sequential compression device was carefully placed on the non-operative leg. Preoperative antibiotics were administered. Surgical time out was performed. After adequate induction of anesthesia, the leg was prepped and draped in the usual sterile fashion, exsanguinated with an Esmarch bandage and the tourniquet inflated to 250 mmHg. A midline incision was performed followed by a medial parapatellar arthrotomy. The patella was subluxed laterally.  A portion of the fat pad, ACL, and anterior horns of the meniscus were excised. The drill hole was placed in the distal femur and the canal was the irrigated and suctioned. The IM fanny was placed and a 5 degree distal valgus cut was performed on the femur. The femur was then sized with a sizing guide. The femoral cutting block was placed and all femoral cuts were performed. The proximal tibia was exposed. We used the extramedullary tibial cutting guide set for removal of 9mm of bone off the high side. The tibial cut was performed. The posterior horns of the menisci were excised. The posterior osteophytes were removed. Flexion extension blocks were then used to balance the knee. The tibial cut surface was then sized with the sizing templates and the tibial and femoral trial were then placed. The knee was placed in full extension and then the tibial tray rotation was then matched to the femoral rotation and marked.    Attention was then placed to the patella. The patella was noted to track centrally through range of motion. The patella was then sized with the trials. The thickness of the patella was then measured. The patella was resurfaced and the surrounding osteophytes were  removed. The preoperative thickness was reproduced. The patella tracked centrally through range of motion.   At this point all trial components were removed, the knee was copiously irrigated with pulsed lavage, and the knee was injected with anesthetic cocktail solution. The cut surfaces were then dried with clean lap sponges, and the components were cemented tibia, followed by femur, then patella. The knee was held in full extension and all excess cement was removed. The knee was held still until the cement had completely hardened. We then placed the trial polyethylene spacer which resulted in full extension and excellent flexion-extension balance. We placed the final polyethylene spacer.   The knee was then copiously irrigated. The tourniquet was then released. There was excellent hemostasis. We placed a one-eighth inch Hemovac drain. We closed the knee in multiple layers in standard fashion. Sterile dressing were applied. At the end of the case, the sponge and needle counts were reported as being correct. There were no known complications. The patient was then transported to the recovery room.      Edwin Ayala M.D.

## 2022-09-20 NOTE — OUTREACH NOTE
Call Center TCM Note    Flowsheet Row Responses   [facility] facility patient discharged from? [place]   Does the patient have one of the following disease processes/diagnoses(primary or secondary)? Total Joint Replacement   Joint surgery performed? Knee   TCM attempt successful? No   Unsuccessful attempts Attempt 2          Belinda Hobbs MA    9/20/2022, 16:13 EDT

## 2022-09-21 ENCOUNTER — TELEPHONE (OUTPATIENT)
Dept: ORTHOPEDIC SURGERY | Facility: HOSPITAL | Age: 47
End: 2022-09-21

## 2022-09-21 ENCOUNTER — TRANSITIONAL CARE MANAGEMENT TELEPHONE ENCOUNTER (OUTPATIENT)
Dept: CALL CENTER | Facility: HOSPITAL | Age: 47
End: 2022-09-21

## 2022-09-21 NOTE — TELEPHONE ENCOUNTER
Attempted to reach Mr. Landers to see how he is doing as he is POD 2 RTK. Message left, awaiting call back.

## 2022-09-21 NOTE — TELEPHONE ENCOUNTER
Post op day 2  Discharge Instructions:  Ask patient about his or her discharge instructions  ?  Patient confirmed understanding   ?  Further instruction needed   What, if any, recommendations, teaching, or interventions did you provide? Click or tap here to enter text.  Health status:  Pain controlled Yes   He has quite a bit of pain, only taking 1 pill at a time for fear of addiction.   Recommended interventions:  Yes  incision/dressing status   ?  Clean without redness, drainage, odor  ?  Redness    ?  Drainage - color Click or tap here to enter text.  ?  Odor  CORONA - Green light blinking Yes  Difficulties urination No  Last BM 9/21/2022 (if no BM by day 3-recommend OTC suppository or fleets enema)  Had a small BM   Medications:  ?Medications reviewed with patient/family/caregiver  Patient taking medications as prescribed?   Yes  If not taking medications as prescribed, note specific medicine(s) and reason for each:  Click or tap here to enter text.  Hospital Follow Up Plan:  Follow up Appointment with Orthopedic surgeon:  ?Has f/u appointment                ?Scheduled f/u appointment  Home Care ordered at discharge?    Yes        Home Care started, or contact made?    Yes   If no, action taken:   DME obtained/used in home?         Yes   Using IS  Choose an item.   Other information: Mr. Landers said he is doing ok. PT has been out to see him. He was having an issue with pain. He was only taking 1 pill as he was scared to take 2 for risk of addiction. Explained that he is taking the medication because of a surgical procedure. The risk of addiction taking the medication short term is slim. Explained the pain scale and how he can determine if he needs 1 or 2. He also said he was doing the exercises and forcing his leg back as far as it could go to get the bend. Explained he shouldn’t force the knee. That is going to cause more pain and swelling. He needs to bend and do the exercises but don’t do too much. He voiced  understanding. He is getting up and walking regularly. The dressing remains CDI. Mr. Landers doesn’t have any other questions for me at this time. He has my contact information should he need anything.

## 2022-09-21 NOTE — OUTREACH NOTE
Call Center TCM Note    Flowsheet Row Responses   [facility] patient discharged from? [place]   Does the patient have one of the following disease processes/diagnoses(primary or secondary)? Total Joint Replacement   Joint surgery performed? Knee   TCM attempt successful? No  [no verbal release on file for PCP]   Unsuccessful attempts Attempt 3          Tari Alcaraz RN    9/21/2022, 14:24 EDT

## 2022-10-04 ENCOUNTER — OFFICE VISIT (OUTPATIENT)
Dept: ORTHOPEDIC SURGERY | Facility: CLINIC | Age: 47
End: 2022-10-04

## 2022-10-04 VITALS — WEIGHT: 198.2 LBS | TEMPERATURE: 97.9 F | BODY MASS INDEX: 37.42 KG/M2 | HEIGHT: 61 IN

## 2022-10-04 DIAGNOSIS — Z96.651 STATUS POST RIGHT KNEE REPLACEMENT: Primary | ICD-10-CM

## 2022-10-04 PROCEDURE — 99024 POSTOP FOLLOW-UP VISIT: CPT | Performed by: ORTHOPAEDIC SURGERY

## 2022-10-04 NOTE — PROGRESS NOTES
Anthony Landers : 1975 MRN: [id number] DATE: 10/4/2022    DIAGNOSIS: 2 week follow up right total knee      SUBJECTIVE:Patient returns today for 2 week follow up of right total knee replacement. Patient reports doing well with no unusual complaints. Appears to be progressing appropriately.    OBJECTIVE:   Exam:. The incision is healing appropriately. No sign of infection. Range of motion is progressing as expected. The calf is soft and nontender with a negative Homans sign.    ASSESSMENT: 2 week status post right knee replacement.    PLAN: 1) Staples removed and steri strips applied   2) Order given for PT   3) Discontinue NATE hose   4) Continue ice PRN   5) aspirin 81 mg orally every day for 1 month   6) Follow up in 6 weeks with repeat Xrays of right knee (3views)    Edwin Ayala MD  10/4/2022

## 2022-10-24 RX ORDER — GABAPENTIN 300 MG/1
CAPSULE ORAL
Qty: 30 CAPSULE | Refills: 2 | OUTPATIENT
Start: 2022-10-24

## 2022-10-24 NOTE — TELEPHONE ENCOUNTER
Rx Refill Note  Requested Prescriptions     Pending Prescriptions Disp Refills   • gabapentin (NEURONTIN) 300 MG capsule [Pharmacy Med Name: GABAPENTIN 300 MG CAPSULE] 30 capsule 2     Sig: TAKE 1 CAPSULE BY MOUTH EVERY DAY      Last office visit with prescribing clinician: 3/17/2022      Next office visit with prescribing clinician: Visit date not found            Robert Rawls MA  10/24/22, 08:13 EDT

## 2022-10-25 ENCOUNTER — TELEPHONE (OUTPATIENT)
Dept: ORTHOPEDIC SURGERY | Facility: CLINIC | Age: 47
End: 2022-10-25

## 2022-10-25 NOTE — TELEPHONE ENCOUNTER
Caller: Anhtony Landers    Relationship to patient: Self    Best call back number: [phone]    Patient is needing: CALLBACK  PATIENT WANTS TO KNOW IF HE CAN DRIVE ONE MILE TO PHYSICAL THERAPY        UNABLE TO WARM TRANSFER

## 2022-10-27 ENCOUNTER — TELEPHONE (OUTPATIENT)
Dept: FAMILY MEDICINE CLINIC | Facility: CLINIC | Age: 47
End: 2022-10-27

## 2022-10-27 RX ORDER — GABAPENTIN 300 MG/1
300 CAPSULE ORAL EVERY EVENING
Qty: 30 CAPSULE | Refills: 0 | Status: SHIPPED | OUTPATIENT
Start: 2022-10-27 | End: 2022-11-03 | Stop reason: SDUPTHER

## 2022-10-27 NOTE — TELEPHONE ENCOUNTER
Patient is scheduled to see you on 11/3/2022.  He has been out of the Gabapentin for a couple of days and is asking to have some sent in to get thru until his appointment.

## 2022-11-03 ENCOUNTER — OFFICE VISIT (OUTPATIENT)
Dept: FAMILY MEDICINE CLINIC | Facility: CLINIC | Age: 47
End: 2022-11-03

## 2022-11-03 VITALS
WEIGHT: 198 LBS | BODY MASS INDEX: 37.38 KG/M2 | DIASTOLIC BLOOD PRESSURE: 68 MMHG | HEIGHT: 61 IN | SYSTOLIC BLOOD PRESSURE: 122 MMHG | OXYGEN SATURATION: 96 % | HEART RATE: 112 BPM

## 2022-11-03 DIAGNOSIS — R20.0 NUMBNESS IN FEET: ICD-10-CM

## 2022-11-03 DIAGNOSIS — G60.9 IDIOPATHIC PERIPHERAL NEUROPATHY: ICD-10-CM

## 2022-11-03 DIAGNOSIS — E78.5 HYPERLIPIDEMIA, UNSPECIFIED HYPERLIPIDEMIA TYPE: ICD-10-CM

## 2022-11-03 DIAGNOSIS — I10 ESSENTIAL HYPERTENSION: Primary | ICD-10-CM

## 2022-11-03 PROCEDURE — 99213 OFFICE O/P EST LOW 20 MIN: CPT | Performed by: INTERNAL MEDICINE

## 2022-11-03 RX ORDER — ALLOPURINOL 100 MG/1
100 TABLET ORAL DAILY
Qty: 30 TABLET | Refills: 5 | Status: SHIPPED | OUTPATIENT
Start: 2022-11-03

## 2022-11-03 RX ORDER — GABAPENTIN 300 MG/1
300 CAPSULE ORAL EVERY EVENING
Qty: 30 CAPSULE | Refills: 5 | Status: SHIPPED | OUTPATIENT
Start: 2022-11-03

## 2022-11-03 RX ORDER — IRBESARTAN 150 MG/1
150 TABLET ORAL
Qty: 30 TABLET | Refills: 5 | Status: SHIPPED | OUTPATIENT
Start: 2022-11-03

## 2022-11-03 RX ORDER — ATORVASTATIN CALCIUM 10 MG/1
10 TABLET, FILM COATED ORAL DAILY
Qty: 30 TABLET | Refills: 5 | Status: SHIPPED | OUTPATIENT
Start: 2022-11-03

## 2022-11-03 NOTE — PROGRESS NOTES
Subjective Chief complaint is medication refill  Anthony Landers is a 47 y.o. male.     History of Present Illness Santos is here today for medication refill.  He seems to be doing okay in terms of his blood pressure.  His heart rate is little bit fast today.  He did find out Saturday that his mother committed suicide.  He was not the one that found her.  She did use a gun and shot herself in the chest.  He says right now he is okay.  He does report that he will seek counseling if things change.  He is on some gabapentin for his neuropathy.  He needs to have that refilled.    The following portions of the patient's history were reviewed and updated as appropriate: allergies, current medications, past family history, past medical history, past social history, past surgical history and problem list.    Review of Systems   Constitutional: Negative for chills and fever.   Respiratory: Negative for chest tightness and shortness of breath.    Cardiovascular: Negative for chest pain.   Musculoskeletal:        He did have recent right total knee replacement.  He is having some expected swelling related to that.       Objective   Physical Exam  Vitals and nursing note reviewed.   Cardiovascular:      Rate and Rhythm: Normal rate and regular rhythm.   Pulmonary:      Effort: Pulmonary effort is normal.      Breath sounds: Normal breath sounds.   Musculoskeletal:      Comments: There is some right suprapatellar swelling.  He also has some mild lower extremity edema on the right which I think would be expected.           Assessment & Plan   Diagnoses and all orders for this visit:    1. Essential hypertension (Primary)    2. Hyperlipidemia, unspecified hyperlipidemia type  -     Lipid Panel    3. Numbness in feet    4. Idiopathic peripheral neuropathy    Other orders  -     allopurinol (ZYLOPRIM) 100 MG tablet; Take 1 tablet by mouth Daily.  Dispense: 30 tablet; Refill: 5  -     atorvastatin (LIPITOR) 10 MG tablet; Take 1 tablet by  mouth Daily.  Dispense: 30 tablet; Refill: 5  -     gabapentin (NEURONTIN) 300 MG capsule; Take 1 capsule by mouth Every Evening.  Dispense: 30 capsule; Refill: 5  -     irbesartan (AVAPRO) 150 MG tablet; Take 1 tablet by mouth every night at bedtime.  Dispense: 30 tablet; Refill: 5    Santos is here today for follow-up.  We did renew his medications.  Unfortunately his mother committed suicide.  He seems to be okay here today other than his heart rates a little fast.  He has been advised to contact me if he needs any assistance.  He has agreed to seek counseling should things get worse in terms of grief.

## 2022-11-04 LAB
CHOLEST SERPL-MCNC: 182 MG/DL (ref 0–200)
HDLC SERPL-MCNC: 41 MG/DL (ref 40–60)
LDLC SERPL CALC-MCNC: 123 MG/DL (ref 0–100)
TRIGL SERPL-MCNC: 98 MG/DL (ref 0–150)
VLDLC SERPL CALC-MCNC: 18 MG/DL (ref 5–40)

## 2022-11-08 RX ORDER — HYOSCYAMINE SULFATE EXTENDED-RELEASE 0.38 MG/1
TABLET ORAL
Qty: 60 TABLET | Refills: 14 | Status: SHIPPED | OUTPATIENT
Start: 2022-11-08

## 2022-11-15 ENCOUNTER — OFFICE VISIT (OUTPATIENT)
Dept: ORTHOPEDIC SURGERY | Facility: CLINIC | Age: 47
End: 2022-11-15

## 2022-11-15 ENCOUNTER — TELEPHONE (OUTPATIENT)
Dept: ORTHOPEDIC SURGERY | Facility: CLINIC | Age: 47
End: 2022-11-15

## 2022-11-15 VITALS — BODY MASS INDEX: 37.21 KG/M2 | RESPIRATION RATE: 12 BRPM | HEIGHT: 61 IN | WEIGHT: 197.1 LBS | TEMPERATURE: 97.5 F

## 2022-11-15 DIAGNOSIS — R52 PAIN: Primary | ICD-10-CM

## 2022-11-15 DIAGNOSIS — Z96.651 STATUS POST RIGHT KNEE REPLACEMENT: ICD-10-CM

## 2022-11-15 PROCEDURE — 73562 X-RAY EXAM OF KNEE 3: CPT | Performed by: NURSE PRACTITIONER

## 2022-11-15 PROCEDURE — 99024 POSTOP FOLLOW-UP VISIT: CPT | Performed by: NURSE PRACTITIONER

## 2022-11-15 RX ORDER — PREDNISONE 10 MG/1
TABLET ORAL
Qty: 30 TABLET | Refills: 0 | Status: SHIPPED | OUTPATIENT
Start: 2022-11-15

## 2022-11-15 NOTE — PROGRESS NOTES
Anthony Landers : 1975 MRN: [id number] DATE: 11/15/2022    DIAGNOSIS: 8 week follow up right total knee      SUBJECTIVE:Patient returns today for 8 week follow up of right total knee replacement. Patient reports he is still having a lot of pain and swelling to the knee. Reports difficulty with flexion exercises and results. Going to PT at [facility]. Still having to use a cane for ambulatory purposes. Denies any s/s of infection and is w/o any other significant complaints.     OBJECTIVE:   Exam:. The incision is well healed. No sign of infection. Global edema noted around knee. Range of motion is measured at 5 to 100. The calf is soft and nontender with a negative Homans sign. Strength is progressing and the patient is ambulating appropriately.    DIAGNOSTIC STUDIES  Xrays: 3 views of the right knee (AP, lateral, and sunrise) were ordered and reviewed for evaluation of recent knee replacement. They demonstrate a well positioned, well aligned knee replacement without complicating factors noted. In comparison with previous films there has been no change.    ASSESSMENT: 8 week status post right knee replacement    PLAN: 1) Continue with PT exercises as prescribed   2) Will give Rx for Prednisone to assist with inflammation and edema   3) Will keep off work for an additional 4 weeks   4) F/U in 4 weeks for re-evaluation    MAZIN Medina  11/15/2022

## 2022-11-15 NOTE — TELEPHONE ENCOUNTER
Provider: TL BYNUM    Caller: VIC GOODEN    Relationship to Patient: SELF    Phone Number: [phone]    Reason for Call: PT FORGOT TO ASK FOR A WORK NOTE STATING HOW LONG HE IS SUPPOSED TO BE OFF AND WHY, SAYS THAT HE NEEDS AS MUCH DETAIL AS POSSIBLE INCLUDED WITH THE NOTE. HE NEEDS TO GET IT BEFORE 11/21. HE IS FINE WITH COMING TO THE OFFICE TO PICK IT UP. PLEASE CALL HIM WHEN IT IS READY.

## 2022-12-13 ENCOUNTER — OFFICE VISIT (OUTPATIENT)
Dept: ORTHOPEDIC SURGERY | Facility: CLINIC | Age: 47
End: 2022-12-13

## 2022-12-13 VITALS — TEMPERATURE: 97.8 F | RESPIRATION RATE: 16 BRPM | BODY MASS INDEX: 37.38 KG/M2 | HEIGHT: 61 IN | WEIGHT: 198 LBS

## 2022-12-13 DIAGNOSIS — Z96.651 S/P TKR (TOTAL KNEE REPLACEMENT), RIGHT: Primary | ICD-10-CM

## 2022-12-13 PROCEDURE — 99024 POSTOP FOLLOW-UP VISIT: CPT | Performed by: NURSE PRACTITIONER

## 2022-12-13 PROCEDURE — 73562 X-RAY EXAM OF KNEE 3: CPT | Performed by: NURSE PRACTITIONER

## 2022-12-13 NOTE — PROGRESS NOTES
"Anthony Landers : 1975 MRN: [id number] DATE: 2022    Chief Complaint:  Follow up right total knee      SUBJECTIVE:Patient returns today for a 12 week follow up of right total knee replacement. Patient reports doing well with no unusual complaints. Denies any limitations due to the knee. Report he still has some mild pain at night time, but overall doing well. Denies any s/s of infection, and he is without any other significant complaints.     OBJECTIVE:    Temp 97.8 °F (36.6 °C) (Temporal)   Resp 16   Ht 154.9 cm (60.98\")   Wt 89.8 kg (198 lb)   BMI 37.43 kg/m²   Family History   Problem Relation Age of Onset   • Aortic stenosis Father    • Febrile seizures Father    • Malig Hyperthermia Neg Hx      Past Medical History:   Diagnosis Date   • Asthma    • GERD (gastroesophageal reflux disease)    • Gout    • Hand, foot and mouth disease 2021   • Hyperlipidemia    • Hypertension    • Irritable bowel syndrome    • Needle phobia    • Peripheral neuropathy    • Primary osteoarthritis of right knee      Past Surgical History:   Procedure Laterality Date   • COLONOSCOPY  2014    ih   • LACERATION REPAIR Right     UPPER EYE   • TOTAL KNEE ARTHROPLASTY Right 2022    Procedure: TOTAL KNEE ARTHROPLASTY;  Surgeon: Edwin Ayala MD;  Location: [facility] OR [facility] [place] [facility];  Service: Orthopedics;  Laterality: Right;   • UPPER GASTROINTESTINAL ENDOSCOPY  2014    nml     Social History     Socioeconomic History   • Marital status: Single   Tobacco Use   • Smoking status: Never   • Smokeless tobacco: Never   Vaping Use   • Vaping Use: Never used   Substance and Sexual Activity   • Alcohol use: No   • Drug use: No   • Sexual activity: Defer       Review of Systems: 14 point review of systems performed pertinent positives and negatives discussed above, all other systems are negative    Exam:. The incision is well healed. Range of motion is measured at 0 to 118. The calf is soft and nontender with a negative " Homans sign. Alignment is neutral. Good quad strength. There is no evidence of varus/valgus or flexion instability. No effusion. Intact to light touch with palpable distal pulses.     DIAGNOSTIC STUDIES  Xrays: 3 views(AP bilateral knees, lateral right, and sunrise bilateral knees) were ordered and reviewed for evaluation of right knee replacement. They demonstrate a well positioned, well aligned knee replacement without complicating factors noted. In comparison with previous films there has been no change.    ASSESSMENT:   Annual follow up right knee replacement. doing well       PLAN:    Continue activities as tolerated  Can return to work full duty without restrictions  Follow up in 9 months for annual visit    MAZIN Medina  12/13/2022

## 2022-12-19 ENCOUNTER — OFFICE VISIT (OUTPATIENT)
Dept: FAMILY MEDICINE CLINIC | Facility: CLINIC | Age: 47
End: 2022-12-19

## 2022-12-19 VITALS
RESPIRATION RATE: 16 BRPM | DIASTOLIC BLOOD PRESSURE: 78 MMHG | SYSTOLIC BLOOD PRESSURE: 110 MMHG | BODY MASS INDEX: 40.09 KG/M2 | HEIGHT: 60 IN | OXYGEN SATURATION: 97 % | HEART RATE: 84 BPM | WEIGHT: 204.2 LBS

## 2022-12-19 DIAGNOSIS — H61.21 IMPACTED CERUMEN OF RIGHT EAR: ICD-10-CM

## 2022-12-19 DIAGNOSIS — H93.11 TINNITUS OF RIGHT EAR: Primary | ICD-10-CM

## 2022-12-19 DIAGNOSIS — H90.11 CONDUCTIVE HEARING LOSS OF RIGHT EAR, UNSPECIFIED HEARING STATUS ON CONTRALATERAL SIDE: ICD-10-CM

## 2022-12-19 PROCEDURE — 99213 OFFICE O/P EST LOW 20 MIN: CPT | Performed by: INTERNAL MEDICINE

## 2022-12-19 PROCEDURE — 69210 REMOVE IMPACTED EAR WAX UNI: CPT | Performed by: INTERNAL MEDICINE

## 2022-12-19 NOTE — PROGRESS NOTES
Subjective Chief complaint is ringing in the right ear  Anthony Landers is a 47 y.o. male.     History of Present Illness Anthony is here today for complaints of ringing in his right ear.  This began approximately 3 weeks ago.  There is associated hearing loss with this.  There is no fever or chills.  There is no dizziness.  There is no ear pain.    The following portions of the patient's history were reviewed and updated as appropriate: allergies, current medications, past family history, past medical history, past social history, past surgical history and problem list.    Review of Systems   Constitutional: Negative for chills and fever.   HENT: Positive for hearing loss and tinnitus.        Objective   Physical Exam  Constitutional:       Appearance: Normal appearance.   HENT:      Right Ear: There is impacted cerumen.      Left Ear: Tympanic membrane normal.      Ears:      Comments: There is minimal cerumen on the left.  Neurological:      Mental Status: He is alert.     I did use a lighted curette.  I removed a large volume of wax from the right ear.  Some of this was epithelialized.  However there was still wax obstructing the eardrum.  I was able to get a glimpse of his eardrum which appeared to be normal.      Assessment & Plan   Diagnoses and all orders for this visit:    1. Tinnitus of right ear (Primary)    2. Conductive hearing loss of right ear, unspecified hearing status on contralateral side    3. Impacted cerumen of right ear    Anthony is here today for ringing in his ears.  Unfortunately even after removing a large volume of wax from the right ear with a lighted curette the majority of the eardrum is still not visible.  I am going to have him use some Debrox.  If this problem persists then he will return for an ear wash.

## 2023-01-23 DIAGNOSIS — Z96.651 S/P TKR (TOTAL KNEE REPLACEMENT), RIGHT: Primary | ICD-10-CM

## 2023-01-23 NOTE — TELEPHONE ENCOUNTER
Returned call to dental office and informed them of antibiotic protocol for s/p joint replacements. Office states pt informed them of replacement sx at check in. Pt was rescheduled by dental office. Instructed to have pt contact office 1 week prior to any dental appointments in the future.

## 2023-01-23 NOTE — TELEPHONE ENCOUNTER
Caller: HARIS    Relationship to patient: NURSE AT DR OFFICE    Best call back number:     Patient is needing: UNABLE TO WARM TRANSFER.  NEEDS TO KNOW IF THERE ARE ANY PRE MEDS NEEDED FOR DENTAL WORK./  PLEASE CONTACT HER

## 2023-01-30 NOTE — TELEPHONE ENCOUNTER
Caller: VIC GOODEN    Relationship to patient: SELF    Best call back number:     Patient is needing: RX FOR ANTIBIOTICS BEFORE DENTAL WORK ON 1/3/23    [facility]/pharmacy #[id number] - [place], [place] - [address] AT [address] [place] - [phone] [facility] [phone]   [phone]

## 2023-01-31 RX ORDER — CEPHALEXIN 500 MG/1
CAPSULE ORAL
Qty: 4 CAPSULE | Refills: 0 | Status: SHIPPED | OUTPATIENT
Start: 2023-01-31

## 2023-05-11 RX ORDER — ALLOPURINOL 100 MG/1
TABLET ORAL
Qty: 30 TABLET | Refills: 5 | Status: SHIPPED | OUTPATIENT
Start: 2023-05-11

## 2023-05-12 RX ORDER — ATORVASTATIN CALCIUM 10 MG/1
TABLET, FILM COATED ORAL
Qty: 30 TABLET | Refills: 5 | Status: SHIPPED | OUTPATIENT
Start: 2023-05-12

## 2023-05-31 RX ORDER — OMEPRAZOLE 40 MG/1
CAPSULE, DELAYED RELEASE ORAL
Qty: 90 CAPSULE | Refills: 0 | Status: SHIPPED | OUTPATIENT
Start: 2023-05-31

## 2023-06-01 RX ORDER — GABAPENTIN 300 MG/1
CAPSULE ORAL
Qty: 30 CAPSULE | OUTPATIENT
Start: 2023-06-01

## 2023-06-01 NOTE — TELEPHONE ENCOUNTER
Caller: Anthony Landers    Relationship: Self    Best call back number: [phone]    Requested Prescriptions:   Requested Prescriptions     Pending Prescriptions Disp Refills   • gabapentin (NEURONTIN) 300 MG capsule [Pharmacy Med Name: GABAPENTIN 300 MG CAPSULE] 30 capsule      Sig: TAKE 1 CAPSULE BY MOUTH EVERY DAY IN THE EVENING        Pharmacy where request should be sent: [facility]/PHARMACY #[id number] - [place], [place] - [address] AT IN [facility] [phone] [facility] [phone]      Last office visit with prescribing clinician: 12/19/2022   Last telemedicine visit with prescribing clinician: Visit date not found   Next office visit with prescribing clinician: Visit date not found     Additional details provided by patient: PATIENT IS OUT OF MEDICATION     Does the patient have less than a 3 day supply:  [x] Yes  [] No    Would you like a call back once the refill request has been completed: [] Yes [x] No    If the office needs to give you a call back, can they leave a voicemail: [] Yes [x] No    Laron Pereira Rep   06/01/23 11:57 EDT

## 2023-06-02 ENCOUNTER — TELEPHONE (OUTPATIENT)
Dept: FAMILY MEDICINE CLINIC | Facility: CLINIC | Age: 48
End: 2023-06-02

## 2023-06-02 NOTE — TELEPHONE ENCOUNTER
Okay for hub to read. Called patient to advise we are unable to refill his prescription as he is overdue for a office visit. Please schedule visit.

## 2023-06-07 ENCOUNTER — OFFICE VISIT (OUTPATIENT)
Dept: FAMILY MEDICINE CLINIC | Facility: CLINIC | Age: 48
End: 2023-06-07
Payer: COMMERCIAL

## 2023-06-07 VITALS
SYSTOLIC BLOOD PRESSURE: 112 MMHG | HEART RATE: 86 BPM | RESPIRATION RATE: 16 BRPM | WEIGHT: 220 LBS | OXYGEN SATURATION: 98 % | HEIGHT: 61 IN | DIASTOLIC BLOOD PRESSURE: 76 MMHG | BODY MASS INDEX: 41.54 KG/M2

## 2023-06-07 DIAGNOSIS — G60.9 IDIOPATHIC PERIPHERAL NEUROPATHY: ICD-10-CM

## 2023-06-07 DIAGNOSIS — E78.5 HYPERLIPIDEMIA, UNSPECIFIED HYPERLIPIDEMIA TYPE: ICD-10-CM

## 2023-06-07 DIAGNOSIS — I10 ESSENTIAL HYPERTENSION: Primary | ICD-10-CM

## 2023-06-07 DIAGNOSIS — M10.00 IDIOPATHIC GOUT, UNSPECIFIED CHRONICITY, UNSPECIFIED SITE: ICD-10-CM

## 2023-06-07 PROCEDURE — 99213 OFFICE O/P EST LOW 20 MIN: CPT | Performed by: INTERNAL MEDICINE

## 2023-06-07 RX ORDER — GABAPENTIN 300 MG/1
300 CAPSULE ORAL EVERY EVENING
Qty: 30 CAPSULE | Refills: 5 | Status: SHIPPED | OUTPATIENT
Start: 2023-06-07

## 2023-06-07 RX ORDER — IRBESARTAN 150 MG/1
150 TABLET ORAL
Qty: 30 TABLET | Refills: 5 | Status: SHIPPED | OUTPATIENT
Start: 2023-06-07

## 2023-06-07 NOTE — PROGRESS NOTES
Subjective chief complaint is follow-up on blood pressure  Anthony Landers is a 48 y.o. male.     History of Present Illness Anthony is here today for follow-up on his blood pressure.  It seems to be fairly well controlled on his current dose of irbesartan.  He has been feeling well without headaches, dizziness, chest pain, or shortness of breath.  Unfortunately his weight is gone up by about 16 pounds.  He has not had any recent attacks of gout.  His cholesterols been fairly well controlled with atorvastatin.  Gabapentin seems to be working well for his neuropathy.  He is not having any intestinal issues with the diclofenac.  The following portions of the patient's history were reviewed and updated as appropriate: allergies, current medications, past family history, past medical history, past social history, past surgical history, and problem list.    Review of Systems   Constitutional:  Negative for chills and fever.   Respiratory:  Negative for chest tightness and shortness of breath.    Cardiovascular:  Negative for chest pain.   Gastrointestinal:  Negative for abdominal pain, blood in stool, constipation and diarrhea.   Neurological:  Negative for dizziness, light-headedness and headache.     Objective   Physical Exam  Constitutional:       Appearance: Normal appearance.   Neck:      Vascular: No carotid bruit.   Cardiovascular:      Rate and Rhythm: Normal rate and regular rhythm.   Pulmonary:      Effort: Pulmonary effort is normal.      Breath sounds: No wheezing, rhonchi or rales.   Abdominal:      Tenderness: There is no abdominal tenderness. There is no guarding or rebound.   Musculoskeletal:      Right lower leg: No edema.      Left lower leg: No edema.   Neurological:      Mental Status: He is alert.       Assessment & Plan   Diagnoses and all orders for this visit:    1. Essential hypertension (Primary)  -     CBC & Differential  -     Comprehensive Metabolic Panel    2. Idiopathic gout, unspecified  chronicity, unspecified site  -     Uric Acid    3. Idiopathic peripheral neuropathy    4. Hyperlipidemia, unspecified hyperlipidemia type  -     Lipid Panel    Other orders  -     irbesartan (AVAPRO) 150 MG tablet; Take 1 tablet by mouth every night at bedtime.  Dispense: 30 tablet; Refill: 5  -     gabapentin (NEURONTIN) 300 MG capsule; Take 1 capsule by mouth Every Evening.  Dispense: 30 capsule; Refill: 5    Anthony is here today for follow-up.  His blood pressure is doing okay.  We are going to check some labs to look at his cholesterol and uric acid.  Because he is on the diclofenac we will also check a chemistry panel and

## 2023-06-08 LAB
ALBUMIN SERPL-MCNC: 4.5 G/DL (ref 4–5)
ALBUMIN/GLOB SERPL: 2 {RATIO} (ref 1.2–2.2)
ALP SERPL-CCNC: 93 IU/L (ref 44–121)
ALT SERPL-CCNC: 22 IU/L (ref 0–44)
AST SERPL-CCNC: 22 IU/L (ref 0–40)
BASOPHILS # BLD AUTO: 0 X10E3/UL (ref 0–0.2)
BASOPHILS NFR BLD AUTO: 1 %
BILIRUB SERPL-MCNC: 0.4 MG/DL (ref 0–1.2)
BUN SERPL-MCNC: 21 MG/DL (ref 6–24)
BUN/CREAT SERPL: 24 (ref 9–20)
CALCIUM SERPL-MCNC: 9.3 MG/DL (ref 8.7–10.2)
CHLORIDE SERPL-SCNC: 105 MMOL/L (ref 96–106)
CHOLEST SERPL-MCNC: 166 MG/DL (ref 100–199)
CO2 SERPL-SCNC: 25 MMOL/L (ref 20–29)
CREAT SERPL-MCNC: 0.88 MG/DL (ref 0.76–1.27)
EGFRCR SERPLBLD CKD-EPI 2021: 105 ML/MIN/1.73
EOSINOPHIL # BLD AUTO: 0.3 X10E3/UL (ref 0–0.4)
EOSINOPHIL NFR BLD AUTO: 5 %
ERYTHROCYTE [DISTWIDTH] IN BLOOD BY AUTOMATED COUNT: 13.2 % (ref 11.6–15.4)
GLOBULIN SER CALC-MCNC: 2.3 G/DL (ref 1.5–4.5)
GLUCOSE SERPL-MCNC: 94 MG/DL (ref 70–99)
HCT VFR BLD AUTO: 48.8 % (ref 37.5–51)
HDLC SERPL-MCNC: 42 MG/DL
HGB BLD-MCNC: 16 G/DL (ref 13–17.7)
IMM GRANULOCYTES # BLD AUTO: 0 X10E3/UL (ref 0–0.1)
IMM GRANULOCYTES NFR BLD AUTO: 0 %
LDLC SERPL CALC-MCNC: 108 MG/DL (ref 0–99)
LYMPHOCYTES # BLD AUTO: 1.3 X10E3/UL (ref 0.7–3.1)
LYMPHOCYTES NFR BLD AUTO: 18 %
MCH RBC QN AUTO: 27 PG (ref 26.6–33)
MCHC RBC AUTO-ENTMCNC: 32.8 G/DL (ref 31.5–35.7)
MCV RBC AUTO: 82 FL (ref 79–97)
MONOCYTES # BLD AUTO: 0.6 X10E3/UL (ref 0.1–0.9)
MONOCYTES NFR BLD AUTO: 9 %
NEUTROPHILS # BLD AUTO: 4.8 X10E3/UL (ref 1.4–7)
NEUTROPHILS NFR BLD AUTO: 67 %
PLATELET # BLD AUTO: 236 X10E3/UL (ref 150–450)
POTASSIUM SERPL-SCNC: 4.8 MMOL/L (ref 3.5–5.2)
PROT SERPL-MCNC: 6.8 G/DL (ref 6–8.5)
RBC # BLD AUTO: 5.92 X10E6/UL (ref 4.14–5.8)
SODIUM SERPL-SCNC: 144 MMOL/L (ref 134–144)
TRIGL SERPL-MCNC: 87 MG/DL (ref 0–149)
URATE SERPL-MCNC: 5.9 MG/DL (ref 3.8–8.4)
VLDLC SERPL CALC-MCNC: 16 MG/DL (ref 5–40)
WBC # BLD AUTO: 7.1 X10E3/UL (ref 3.4–10.8)

## 2023-08-29 DIAGNOSIS — Z96.651 S/P TKR (TOTAL KNEE REPLACEMENT), RIGHT: Primary | ICD-10-CM

## 2023-08-29 RX ORDER — CEPHALEXIN 500 MG/1
CAPSULE ORAL
Qty: 4 CAPSULE | Refills: 0 | Status: SHIPPED | OUTPATIENT
Start: 2023-08-29

## 2023-08-29 NOTE — TELEPHONE ENCOUNTER
Provider: DR DORSEY   Caller: VIC GOODEN   Relationship to Patient: SELF     Phone Number: [phone]    Reason for Call: PATIENT IS GOING TO THE DENTIST ON 08/31/23 AND NEEDS AN ANTIBIOTIC CALLED IN FOR HIM. HE HAD RIGHT KNEE REPLACEMENT SURGERY     PATIENT IS REQUESTING A CALL BACK ONCE COMPLETED   PLEASE ADVISE

## 2023-08-31 RX ORDER — OMEPRAZOLE 40 MG/1
CAPSULE, DELAYED RELEASE ORAL
Qty: 90 CAPSULE | Refills: 0 | Status: SHIPPED | OUTPATIENT
Start: 2023-08-31

## 2023-09-01 NOTE — TELEPHONE ENCOUNTER
Patient was sleeping and woke up to take phone call. Requested he call back to schedule his yearly follow up visit.

## 2023-09-14 ENCOUNTER — OFFICE VISIT (OUTPATIENT)
Dept: ORTHOPEDIC SURGERY | Facility: CLINIC | Age: 48
End: 2023-09-14
Payer: COMMERCIAL

## 2023-09-14 VITALS — HEIGHT: 61 IN | WEIGHT: 219 LBS | BODY MASS INDEX: 41.35 KG/M2 | TEMPERATURE: 97.5 F

## 2023-09-14 DIAGNOSIS — Z96.651 S/P TKR (TOTAL KNEE REPLACEMENT), RIGHT: Primary | ICD-10-CM

## 2023-09-14 NOTE — PROGRESS NOTES
Patient: Anthony Landers  YOB: 1975 48 y.o. male  Medical Record Number: [id number]    Chief Complaints:   Chief Complaint   Patient presents with    Right Knee - Follow-up       History of Present Illness:Anthony Landers is a 48 y.o. male who presents for follow-up of right total knee replacement he is 1 year out doing great denies any problems    Allergies:   Allergies   Allergen Reactions    Propranolol Shortness Of Breath       Medications:   Current Outpatient Medications   Medication Sig Dispense Refill    allopurinol (ZYLOPRIM) 100 MG tablet TAKE 1 TABLET BY MOUTH EVERY DAY 30 tablet 5    aspirin 81 MG EC tablet Take 1 tablet by mouth twice daily x 14 days, then take 1 tablet by mouth once daily x 30 days 60 tablet 0    atorvastatin (LIPITOR) 10 MG tablet TAKE 1 TABLET BY MOUTH EVERY DAY 30 tablet 5    cephalexin (KEFLEX) 500 MG capsule Take all 4 capsules one hour prior to procedure 4 capsule 0    diclofenac (VOLTAREN) 50 MG EC tablet TAKE 1 TABLET BY MOUTH TWICE A DAY 60 tablet 5    gabapentin (NEURONTIN) 300 MG capsule Take 1 capsule by mouth Every Evening. 30 capsule 5    hyoscyamine (LEVBID) 0.375 MG 12 hr tablet TAKE 1 TABLET BY MOUTH EVERY 12 HOURS AS NEEDED FOR CRAMPING OR DIARRHEA 60 tablet 14    irbesartan (AVAPRO) 150 MG tablet Take 1 tablet by mouth every night at bedtime. 30 tablet 5    omeprazole (priLOSEC) 40 MG capsule TAKE 1 CAPSULE BY MOUTH EVERY DAY 90 capsule 0     No current facility-administered medications for this visit.         The following portions of the patient's history were reviewed and updated as appropriate: allergies, current medications, past family history, past medical history, past social history, past surgical history and problem list.    Review of Systems:   14 point review of systems was performed. All systems negative except pertinent positives/negatives listed in HPI above    Physical Exam:   Vitals:    09/14/23 1103   Temp: 97.5 °F (36.4 °C)   TempSrc:  "Temporal   Weight: 99.3 kg (219 lb)   Height: 154.9 cm (60.98\")   PainSc:   2   PainLoc: Knee       General: A and O x 3, ASA, NAD    Clear no unusual lesions noted  Right knee patient has excellent range of motion no instability calf soft and nontender      Radiology:  Xrays 3views (ap,lateral, sunrise) right knee were ordered and reviewed today secondary to previous surgery show well-placed well-positioned right total knee replacement comparative views are unchanged    Assessment/Plan: Status post right TKA stable    Patient and I discussed options, he will continue with regular exercise program we will see him back as needed      Tammi Hamm, APRN  9/14/2023  "

## 2023-11-09 RX ORDER — ALLOPURINOL 100 MG/1
TABLET ORAL
Qty: 30 TABLET | Refills: 5 | Status: SHIPPED | OUTPATIENT
Start: 2023-11-09

## 2023-11-27 RX ORDER — HYOSCYAMINE SULFATE EXTENDED-RELEASE 0.38 MG/1
TABLET ORAL
Qty: 60 TABLET | Refills: 14 | Status: SHIPPED | OUTPATIENT
Start: 2023-11-27

## 2023-11-28 NOTE — TELEPHONE ENCOUNTER
Caller: Anthony Landers    Relationship: Self    Best call back number: [phone]    Requested Prescriptions:   Requested Prescriptions     Pending Prescriptions Disp Refills    irbesartan (AVAPRO) 150 MG tablet 30 tablet 5     Sig: Take 1 tablet by mouth every night at bedtime.    allopurinol (ZYLOPRIM) 100 MG tablet 30 tablet 5     Sig: Take 1 tablet by mouth Daily.    atorvastatin (LIPITOR) 10 MG tablet 30 tablet 5     Sig: Take 1 tablet by mouth Daily.    diclofenac (VOLTAREN) 50 MG EC tablet 60 tablet 5     Sig: Take 1 tablet by mouth 2 (Two) Times a Day.    omeprazole (priLOSEC) 40 MG capsule 90 capsule 0     Sig: Take 1 capsule by mouth Daily.    gabapentin (NEURONTIN) 300 MG capsule 30 capsule 5     Sig: Take 1 capsule by mouth Every Evening.        Pharmacy where request should be sent: [facility]/PHARMACY #[id number] [place], [place] - [address] AT IN [facility] - [phone] [facility] [phone] FX     Last office visit with prescribing clinician: 6/7/2023   Last telemedicine visit with prescribing clinician: Visit date not found   Next office visit with prescribing clinician: 12/8/2023     Does the patient have less than a 3 day supply:  [x] Yes  [] No    Would you like a call back once the refill request has been completed: [x] Yes [] No    If the office needs to give you a call back, can they leave a voicemail: [x] Yes [] No    Laron Barber Rep   11/28/23 13:45 EST

## 2023-11-29 RX ORDER — ATORVASTATIN CALCIUM 10 MG/1
10 TABLET, FILM COATED ORAL DAILY
Qty: 30 TABLET | Refills: 5 | Status: SHIPPED | OUTPATIENT
Start: 2023-11-29 | End: 2024-05-27

## 2023-11-29 RX ORDER — GABAPENTIN 300 MG/1
300 CAPSULE ORAL EVERY EVENING
Qty: 30 CAPSULE | Refills: 5 | Status: SHIPPED | OUTPATIENT
Start: 2023-11-29

## 2023-11-29 RX ORDER — ALLOPURINOL 100 MG/1
100 TABLET ORAL DAILY
Qty: 30 TABLET | Refills: 5 | Status: SHIPPED | OUTPATIENT
Start: 2023-11-29 | End: 2024-05-27

## 2023-11-29 RX ORDER — OMEPRAZOLE 40 MG/1
40 CAPSULE, DELAYED RELEASE ORAL DAILY
Qty: 90 CAPSULE | Refills: 0 | Status: SHIPPED | OUTPATIENT
Start: 2023-11-29

## 2023-11-29 RX ORDER — IRBESARTAN 150 MG/1
150 TABLET ORAL
Qty: 30 TABLET | Refills: 5 | Status: SHIPPED | OUTPATIENT
Start: 2023-11-29

## 2023-12-05 NOTE — PROGRESS NOTES
"Chief Complaint  Irritable Bowel Syndrome    Subjective          History of Present Illness    Anthony Landers is a  48 y.o. male patient of Dr. Melendez with a history of IBS with diarrhea and hemorrhoids in addition to GERD. Last seen in the office by MAZIN Le. He is new to me. He has been well maintained on Prilosec 40 daily for his GERD symptoms.     Reviewed last EGD in May of 2014 which was normal.     Reviewed Last colonoscopy in May 2014 with recall in 10 years. He will be due for screening colonoscopy May of 2024.    Patient reports today he is doing well. No nausea, vomiting, or heartburn. Normal daily stools no, constipation, diarrhea or cramping, no melena or hematochezia.         Objective   Vital Signs:   /71   Pulse 77   Temp 96.1 °F (35.6 °C) (Temporal)   Ht 185.4 cm (73\")   Wt 106 kg (233 lb)   SpO2 96%   BMI 30.74 kg/m²       Physical Exam  Constitutional:       Appearance: Normal appearance.   Pulmonary:      Effort: Pulmonary effort is normal. No respiratory distress.   Abdominal:      General: Abdomen is flat. Bowel sounds are normal. There is no distension.      Palpations: Abdomen is soft. There is no mass.      Tenderness: There is no abdominal tenderness. There is no guarding or rebound.      Hernia: No hernia is present.   Skin:     General: Skin is warm and dry.   Neurological:      Mental Status: He is alert.   Psychiatric:         Mood and Affect: Mood normal.          Result Review :   The following data was reviewed by: MAZIN Solano on 12/06/2023:  CMP          6/7/2023    11:54   CMP   Glucose 94    BUN 21    Creatinine 0.88    Sodium 144    Potassium 4.8    Chloride 105    Calcium 9.3    Total Protein 6.8    Albumin 4.5    Globulin 2.3    Total Bilirubin 0.4    Alkaline Phosphatase 93    AST (SGOT) 22    ALT (SGPT) 22    BUN/Creatinine Ratio 24      CBC w/diff          6/7/2023    11:54   CBC w/Diff   WBC 7.1    RBC 5.92    Hemoglobin 16.0    Hematocrit 48.8 "    MCV 82    MCH 27.0    MCHC 32.8    RDW 13.2    Platelets 236    Neutrophil Rel % 67    Lymphocyte Rel % 18    Monocyte Rel % 9    Eosinophil Rel % 5    Basophil Rel % 1            Assessment and Plan    Diagnoses and all orders for this visit:    1. Irritable bowel syndrome with diarrhea (Primary)    2. Gastroesophageal reflux disease without esophagitis    Other orders  -     hyoscyamine (LEVBID) 0.375 MG 12 hr tablet; Take 1 pill by mouth every 12 hours as needed for cramping or diarrhea  Dispense: 60 tablet; Refill: 14  -     omeprazole (priLOSEC) 40 MG capsule; Take 1 capsule by mouth Daily for 360 days.  Dispense: 90 capsule; Refill: 3    Continue Levbid  every 12 hours as needed for diarrhea and cramping.  Continue Prilosec 40 mg daily  Will be due for screening colonoscopy in May of 2024 he will receive a message to schedule.       Follow Up   Return in about 5 months (around 5/6/2024) for Screening colonoscopy.    Dragon dictation used throughout this note.     MAZIN Solano

## 2023-12-06 ENCOUNTER — OFFICE VISIT (OUTPATIENT)
Dept: GASTROENTEROLOGY | Facility: CLINIC | Age: 48
End: 2023-12-06
Payer: COMMERCIAL

## 2023-12-06 VITALS
HEIGHT: 73 IN | SYSTOLIC BLOOD PRESSURE: 103 MMHG | WEIGHT: 233 LBS | DIASTOLIC BLOOD PRESSURE: 71 MMHG | OXYGEN SATURATION: 96 % | BODY MASS INDEX: 30.88 KG/M2 | HEART RATE: 77 BPM | TEMPERATURE: 96.1 F

## 2023-12-06 DIAGNOSIS — K58.0 IRRITABLE BOWEL SYNDROME WITH DIARRHEA: Primary | ICD-10-CM

## 2023-12-06 DIAGNOSIS — K21.9 GASTROESOPHAGEAL REFLUX DISEASE WITHOUT ESOPHAGITIS: ICD-10-CM

## 2023-12-06 RX ORDER — OMEPRAZOLE 40 MG/1
40 CAPSULE, DELAYED RELEASE ORAL DAILY
Qty: 90 CAPSULE | Refills: 3 | Status: SHIPPED | OUTPATIENT
Start: 2023-12-06 | End: 2024-11-30

## 2023-12-06 RX ORDER — HYOSCYAMINE SULFATE EXTENDED-RELEASE 0.38 MG/1
TABLET ORAL
Qty: 60 TABLET | Refills: 14 | Status: SHIPPED | OUTPATIENT
Start: 2023-12-06

## 2023-12-08 ENCOUNTER — OFFICE VISIT (OUTPATIENT)
Dept: FAMILY MEDICINE CLINIC | Facility: CLINIC | Age: 48
End: 2023-12-08
Payer: COMMERCIAL

## 2023-12-08 VITALS
HEART RATE: 86 BPM | WEIGHT: 236 LBS | SYSTOLIC BLOOD PRESSURE: 110 MMHG | RESPIRATION RATE: 16 BRPM | DIASTOLIC BLOOD PRESSURE: 66 MMHG | BODY MASS INDEX: 31.28 KG/M2 | HEIGHT: 73 IN | OXYGEN SATURATION: 97 %

## 2023-12-08 DIAGNOSIS — E78.5 HYPERLIPIDEMIA, UNSPECIFIED HYPERLIPIDEMIA TYPE: ICD-10-CM

## 2023-12-08 DIAGNOSIS — I10 ESSENTIAL HYPERTENSION: Primary | ICD-10-CM

## 2023-12-08 DIAGNOSIS — M10.00 IDIOPATHIC GOUT, UNSPECIFIED CHRONICITY, UNSPECIFIED SITE: ICD-10-CM

## 2023-12-08 DIAGNOSIS — M17.11 PRIMARY OSTEOARTHRITIS OF RIGHT KNEE: ICD-10-CM

## 2023-12-08 PROCEDURE — 99213 OFFICE O/P EST LOW 20 MIN: CPT | Performed by: INTERNAL MEDICINE

## 2023-12-08 NOTE — PROGRESS NOTES
Subjective chief complaint is follow-up on blood pressure  Anthony Landers is a 48 y.o. male.     Hypertension  Pertinent negatives include no chest pain or shortness of breath.  Santos is here today for follow-up.  His blood pressure appears to be well-controlled on the current dose of irbesartan.  He does have gout and osteoarthritis.  He is on some diclofenac routinely and it is not bothering his stomach.  He does have some swelling in his right knee at present but it is not affecting his ability to walk or do his job.  He is on some atorvastatin for his cholesterol and that seems to be working fairly well for him as well.  He is due to have some lab work on that today.    The following portions of the patient's history were reviewed and updated as appropriate: allergies, current medications, and problem list.    Review of Systems   Respiratory:  Negative for chest tightness and shortness of breath.    Cardiovascular:  Positive for leg swelling. Negative for chest pain.   Musculoskeletal:  Positive for joint swelling.     Objective   Physical Exam  Vitals and nursing note reviewed.   Constitutional:       Appearance: Normal appearance.   Neck:      Vascular: No carotid bruit.   Cardiovascular:      Rate and Rhythm: Normal rate and regular rhythm.      Pulses: Normal pulses.      Heart sounds: No murmur heard.     No friction rub. No gallop.   Pulmonary:      Effort: Pulmonary effort is normal.      Breath sounds: No wheezing, rhonchi or rales.   Abdominal:      General: Bowel sounds are normal.      Palpations: Abdomen is soft.      Tenderness: There is no abdominal tenderness. There is no guarding or rebound.   Musculoskeletal:      Right lower leg: No edema.      Left lower leg: No edema.      Comments: There is a right knee effusion.   Neurological:      Mental Status: He is alert.       Assessment & Plan   Diagnoses and all orders for this visit:    1. Essential hypertension (Primary)  -     CBC & Differential  -      Comprehensive Metabolic Panel    2. Hyperlipidemia, unspecified hyperlipidemia type  -     Comprehensive Metabolic Panel  -     Lipid Panel    3. Idiopathic gout, unspecified chronicity, unspecified site  -     Uric Acid    4. Primary osteoarthritis of right knee    Anthony is here today for follow-up.  His blood pressure is doing well.  We are going to check on some appropriate labs today to decide whether he can continue to take the diclofenac.              show

## 2023-12-09 LAB
ALBUMIN SERPL-MCNC: 4.5 G/DL (ref 3.5–5.2)
ALBUMIN/GLOB SERPL: 2.4 G/DL
ALP SERPL-CCNC: 95 U/L (ref 39–117)
ALT SERPL-CCNC: 37 U/L (ref 1–41)
AST SERPL-CCNC: 31 U/L (ref 1–40)
BASOPHILS # BLD AUTO: 0.03 10*3/MM3 (ref 0–0.2)
BASOPHILS NFR BLD AUTO: 0.4 % (ref 0–1.5)
BILIRUB SERPL-MCNC: 0.5 MG/DL (ref 0–1.2)
BUN SERPL-MCNC: 19 MG/DL (ref 6–20)
BUN/CREAT SERPL: 21.6 (ref 7–25)
CALCIUM SERPL-MCNC: 9.2 MG/DL (ref 8.6–10.5)
CHLORIDE SERPL-SCNC: 105 MMOL/L (ref 98–107)
CHOLEST SERPL-MCNC: 176 MG/DL (ref 0–200)
CO2 SERPL-SCNC: 28.6 MMOL/L (ref 22–29)
CREAT SERPL-MCNC: 0.88 MG/DL (ref 0.76–1.27)
EGFRCR SERPLBLD CKD-EPI 2021: 104.8 ML/MIN/1.73
EOSINOPHIL # BLD AUTO: 0.49 10*3/MM3 (ref 0–0.4)
EOSINOPHIL NFR BLD AUTO: 6.8 % (ref 0.3–6.2)
ERYTHROCYTE [DISTWIDTH] IN BLOOD BY AUTOMATED COUNT: 13.2 % (ref 12.3–15.4)
GLOBULIN SER CALC-MCNC: 1.9 GM/DL
GLUCOSE SERPL-MCNC: 99 MG/DL (ref 65–99)
HCT VFR BLD AUTO: 46.4 % (ref 37.5–51)
HDLC SERPL-MCNC: 38 MG/DL (ref 40–60)
HGB BLD-MCNC: 15.2 G/DL (ref 13–17.7)
IMM GRANULOCYTES # BLD AUTO: 0.02 10*3/MM3 (ref 0–0.05)
IMM GRANULOCYTES NFR BLD AUTO: 0.3 % (ref 0–0.5)
LDLC SERPL CALC-MCNC: 121 MG/DL (ref 0–100)
LYMPHOCYTES # BLD AUTO: 1.18 10*3/MM3 (ref 0.7–3.1)
LYMPHOCYTES NFR BLD AUTO: 16.4 % (ref 19.6–45.3)
MCH RBC QN AUTO: 27.1 PG (ref 26.6–33)
MCHC RBC AUTO-ENTMCNC: 32.8 G/DL (ref 31.5–35.7)
MCV RBC AUTO: 82.9 FL (ref 79–97)
MONOCYTES # BLD AUTO: 0.7 10*3/MM3 (ref 0.1–0.9)
MONOCYTES NFR BLD AUTO: 9.7 % (ref 5–12)
NEUTROPHILS # BLD AUTO: 4.79 10*3/MM3 (ref 1.7–7)
NEUTROPHILS NFR BLD AUTO: 66.4 % (ref 42.7–76)
NRBC BLD AUTO-RTO: 0 /100 WBC (ref 0–0.2)
PLATELET # BLD AUTO: 232 10*3/MM3 (ref 140–450)
POTASSIUM SERPL-SCNC: 4.3 MMOL/L (ref 3.5–5.2)
PROT SERPL-MCNC: 6.4 G/DL (ref 6–8.5)
RBC # BLD AUTO: 5.6 10*6/MM3 (ref 4.14–5.8)
SODIUM SERPL-SCNC: 142 MMOL/L (ref 136–145)
TRIGL SERPL-MCNC: 90 MG/DL (ref 0–150)
URATE SERPL-MCNC: 6.7 MG/DL (ref 3.4–7)
VLDLC SERPL CALC-MCNC: 17 MG/DL (ref 5–40)
WBC # BLD AUTO: 7.21 10*3/MM3 (ref 3.4–10.8)

## 2023-12-12 ENCOUNTER — TELEPHONE (OUTPATIENT)
Dept: FAMILY MEDICINE CLINIC | Facility: CLINIC | Age: 48
End: 2023-12-12
Payer: COMMERCIAL

## 2023-12-12 NOTE — TELEPHONE ENCOUNTER
Hub staff attempted to follow warm transfer process and was unsuccessful     Caller: Anthony Landers    Relationship to patient: Self    Best call back number: [phone]     Patient is needing: PATIENT IS RETURNING A CALL THAT HE MISSED YESTERDAY. PATIENT STATED THAT HE HAD LAB TESTS DONE SO IT MAY HAVE BEEN REGARDING THE RESULTS. PATIENT STATED THAT HE LEAVES FOR WORK AT 1:30PM AND IS REQUESTING A CALL BACK BEFORE THEN. PLEASE ADVISE.    A message was left regarding diet

## 2023-12-13 NOTE — TELEPHONE ENCOUNTER
UNABLE TO WARM TRANSFER  Caller: Anthony Landers    Relationship to patient: Self    Best call back number: [phone]     PATIENT IS RETURNING CALL     PATIENT STATES HE LEAVES FOR WORK AT 1:30

## 2024-01-22 ENCOUNTER — TELEPHONE (OUTPATIENT)
Dept: FAMILY MEDICINE CLINIC | Facility: CLINIC | Age: 49
End: 2024-01-22

## 2024-01-22 NOTE — TELEPHONE ENCOUNTER
Caller: Anthony Landers    Relationship: Self    Best call back number: [phone]    What was the call regarding: PATIENT STATED HE IS NO LONGER USING "[facility]Y, AND HAS SWITCHED TO [facility] #[id number] [address] AT [facility] [address] - [phone]  - [phone]   .

## 2024-01-22 NOTE — TELEPHONE ENCOUNTER
Hub to relay:  It has been updated in the system as [facility] #[id number] - [place], [place] - [address] AT [facility] [address]  - [phone]  - [phone] FX   patients main pharmacy in their chart

## 2024-01-29 RX ORDER — GABAPENTIN 300 MG/1
300 CAPSULE ORAL EVERY EVENING
Qty: 30 CAPSULE | Refills: 5 | Status: SHIPPED | OUTPATIENT
Start: 2024-01-29

## 2024-01-29 NOTE — TELEPHONE ENCOUNTER
Caller: Anthony Landers    Relationship: Self    Best call back number:    [phone] (Mobile)       Requested Prescriptions:   gabapentin (NEURONTIN) 300 MG capsule        Pharmacy where request should be sent:  [facility] #[id number] [facility] [address] AT [facility] [address]  - [phone]  - [phone] FX     Last office visit with prescribing clinician: 12/8/2023   Last telemedicine visit with prescribing clinician: Visit date not found   Next office visit with prescribing clinician: Visit date not found     Additional details provided by patient: PATIENT CALLED TO REQUEST A MEDICATION REFILL ON HIS MEDICATION. PATIENT STATES THAT HE IS COMPLETELY OUT MEDICATION.        Does the patient have less than a 3 day supply:  [x] Yes  [] No    Would you like a call back once the refill request has been completed: [] Yes [] No    If the office needs to give you a call back, can they leave a voicemail: [] Yes [] No    Laron Quigley Rep   01/29/24 11:53 EST         THANKS

## 2024-02-19 RX ORDER — ALLOPURINOL 100 MG/1
100 TABLET ORAL DAILY
Qty: 30 TABLET | Refills: 5 | Status: SHIPPED | OUTPATIENT
Start: 2024-02-19 | End: 2024-08-17

## 2024-02-19 RX ORDER — IRBESARTAN 150 MG/1
150 TABLET ORAL
Qty: 30 TABLET | Refills: 5 | Status: SHIPPED | OUTPATIENT
Start: 2024-02-19

## 2024-02-19 NOTE — TELEPHONE ENCOUNTER
Caller: Anthony Landers    Relationship: Self    Best call back number: [phone]    Requested Prescriptions:   Requested Prescriptions     Pending Prescriptions Disp Refills    allopurinol (ZYLOPRIM) 100 MG tablet 30 tablet 5     Sig: Take 1 tablet by mouth Daily for 180 days.    irbesartan (AVAPRO) 150 MG tablet 30 tablet 5     Sig: Take 1 tablet by mouth every night at bedtime.    diclofenac (VOLTAREN) 50 MG EC tablet 60 tablet 5     Sig: Take 1 tablet by mouth 2 (Two) Times a Day.        Pharmacy where request should be sent: [facility] [id number] [place] [address] AT [facility] [address] ([facility])  [phone] [facility] [phone] FX     Last office visit with prescribing clinician: 12/8/2023   Last telemedicine visit with prescribing clinician: Visit date not found   Next office visit with prescribing clinician: Visit date not found     Additional details provided by patient: CHANGING PHARMACIES    Does the patient have less than a 3 day supply:  [x] Yes  [] No        Laron Landrum Rep   02/19/24 14:16 EST

## 2024-02-26 RX ORDER — HYOSCYAMINE SULFATE EXTENDED-RELEASE 0.38 MG/1
TABLET ORAL
Qty: 50 TABLET | Refills: 3 | Status: SHIPPED | OUTPATIENT
Start: 2024-02-26

## 2024-03-04 ENCOUNTER — TELEPHONE (OUTPATIENT)
Dept: GASTROENTEROLOGY | Facility: CLINIC | Age: 49
End: 2024-03-04
Payer: COMMERCIAL

## 2024-03-04 RX ORDER — GABAPENTIN 300 MG/1
300 CAPSULE ORAL EVERY EVENING
Qty: 30 CAPSULE | Refills: 2 | Status: SHIPPED | OUTPATIENT
Start: 2024-03-04

## 2024-03-04 RX ORDER — ATORVASTATIN CALCIUM 10 MG/1
10 TABLET, FILM COATED ORAL DAILY
Qty: 30 TABLET | Refills: 5 | Status: SHIPPED | OUTPATIENT
Start: 2024-03-04 | End: 2024-08-31

## 2024-03-04 NOTE — TELEPHONE ENCOUNTER
Caller: Anthony Landers    Relationship: Self    Best call back number: [phone]    Requested Prescriptions:   Requested Prescriptions      No prescriptions requested or ordered in this encounter    hyoscyamine (LEVBID) 0.375 MG 12 hr tablet [86641] (Order [id number])     Pharmacy where request should be sent:  [facility] [id number] [place] [address] [address]  [facility]  [phone] [facility] [phone]      Last office visit with prescribing clinician: Visit date not found   Last telemedicine visit with prescribing clinician: Visit date not found   Next office visit with prescribing clinician: Visit date not found         Does the patient have less than a 3 day supply:  [x] Yes  [] No    Would you like a call back once the refill request has been completed: [] Yes [x] No    If the office needs to give you a call back, can they leave a voicemail: [] Yes [x] No    Laron Walker Rep   03/04/24 14:16 EST

## 2024-03-04 NOTE — TELEPHONE ENCOUNTER
Caller: Anthony Landers    Relationship: Self    Best call back number: [phone]    Requested Prescriptions:   Requested Prescriptions     Pending Prescriptions Disp Refills    atorvastatin (LIPITOR) 10 MG tablet 30 tablet 5     Sig: Take 1 tablet by mouth Daily for 180 days.    gabapentin (NEURONTIN) 300 MG capsule 30 capsule 5     Sig: Take 1 capsule by mouth Every Evening.        Pharmacy where request should be sent: [facility] [id number] [place] [id number] [facility] [address] [facility])  [phone] [facility] [phone]      Last office visit with prescribing clinician: 12/8/2023   Last telemedicine visit with prescribing clinician: Visit date not found   Next office visit with prescribing clinician: Visit date not found     Additional details provided by patient:     Does the patient have less than a 3 day supply:  [] Yes  [] No    Would you like a call back once the refill request has been completed: [] Yes [] No    If the office needs to give you a call back, can they leave a voicemail: [] Yes [] No    Laron Murillo Rep   03/04/24 14:12 EST

## 2024-03-07 RX ORDER — HYOSCYAMINE SULFATE 0.38 MG/1
TABLET, EXTENDED RELEASE ORAL
Qty: 60 TABLET | Refills: 3 | Status: SHIPPED | OUTPATIENT
Start: 2024-03-07

## 2024-03-12 NOTE — TELEPHONE ENCOUNTER
Caller: Anthony Landers    Relationship: Self    Best call back number: [phone] DON'T CALL BEFORE 11AM, SINCE HE'S SLEEPING.    Requested Prescriptions:   Requested Prescriptions     Pending Prescriptions Disp Refills    omeprazole (priLOSEC) 40 MG capsule 90 capsule 3     Sig: Take 1 capsule by mouth Daily for 360 days.        Pharmacy where request should be sent: [facility] [id number] [place] [address] [address] AT [facility] [address] ([facility]) - [phone] [facility] [phone]  [phone]      Last office visit with prescribing clinician: Visit date not found   Last telemedicine visit with prescribing clinician: Visit date not found   Next office visit with prescribing clinician: Visit date not found     Additional details provided by patient: HE IS NEEDING THIS RX TO BE REFILLED AND HE'S OUT.     Does the patient have less than a 3 day supply:  [x] Yes  [] No    Would you like a call back once the refill request has been completed: [] Yes [] No    If the office needs to give you a call back, can they leave a voicemail: [] Yes [] No    Laron Cline Rep   03/12/24 11:17 EDT

## 2024-03-13 RX ORDER — OMEPRAZOLE 40 MG/1
40 CAPSULE, DELAYED RELEASE ORAL DAILY
Qty: 90 CAPSULE | Refills: 3 | Status: SHIPPED | OUTPATIENT
Start: 2024-03-13 | End: 2025-03-08

## 2024-05-10 ENCOUNTER — TELEPHONE (OUTPATIENT)
Dept: FAMILY MEDICINE CLINIC | Facility: CLINIC | Age: 49
End: 2024-05-10
Payer: COMMERCIAL

## 2024-05-22 ENCOUNTER — OFFICE VISIT (OUTPATIENT)
Dept: FAMILY MEDICINE CLINIC | Facility: CLINIC | Age: 49
End: 2024-05-22
Payer: COMMERCIAL

## 2024-05-22 VITALS
DIASTOLIC BLOOD PRESSURE: 78 MMHG | HEIGHT: 73 IN | SYSTOLIC BLOOD PRESSURE: 116 MMHG | RESPIRATION RATE: 16 BRPM | OXYGEN SATURATION: 95 % | HEART RATE: 90 BPM | WEIGHT: 232 LBS | BODY MASS INDEX: 30.75 KG/M2

## 2024-05-22 DIAGNOSIS — I10 ESSENTIAL HYPERTENSION: ICD-10-CM

## 2024-05-22 DIAGNOSIS — M10.00 IDIOPATHIC GOUT, UNSPECIFIED CHRONICITY, UNSPECIFIED SITE: ICD-10-CM

## 2024-05-22 DIAGNOSIS — L82.1 SEBORRHEIC KERATOSIS: Primary | ICD-10-CM

## 2024-05-22 PROCEDURE — 99213 OFFICE O/P EST LOW 20 MIN: CPT | Performed by: INTERNAL MEDICINE

## 2024-05-22 NOTE — PROGRESS NOTES
Subjective chief complaint is a spot on the arm  Anthony Landers is a 49 y.o. male.    History of Present Illness Anthony is here today for complaints of a spot on his left arm.  It is in the region of the elbow.  He first noticed it about a week ago.  He is concerned because there have been melanomas or skin cancers in the family.  This is not necessarily painful.  He has picked at it and some of it does flake off.  He also has gout.  He is on allopurinol.  His last uric acid was 6.7.  His blood pressure today looks okay on his irbesartan.    The following portions of the patient's history were reviewed and updated as appropriate: allergies, current medications, and problem list.    Review of Systems    Objective   Physical Exam  Vitals and nursing note reviewed.   Constitutional:       Appearance: Normal appearance.   Cardiovascular:      Rate and Rhythm: Normal rate.   Pulmonary:      Effort: Pulmonary effort is normal.   Skin:     Comments: There is a seborrheic lesion on the left elbow.  There are smaller separate lesions on his hand.   Neurological:      Mental Status: He is alert.       Assessment & Plan   Diagnoses and all orders for this visit:    1. Seborrheic keratosis (Primary)    2. Essential hypertension  -     Comprehensive Metabolic Panel  -     CBC & Differential    3. Idiopathic gout, unspecified chronicity, unspecified site  -     Uric Acid    Anthony is here today for spot on his left arm that is a seborrheic keratosis.  I did advise that this is not a melanoma.  It may get larger.  If he picks at it may flake off.  He is due to have some lab work because of some chronic anti-inflammatory therapy and his blood pressure.  Will go ahead and accommodate him on that today.

## 2024-05-23 LAB
ALBUMIN SERPL-MCNC: 4.6 G/DL (ref 3.5–5.2)
ALBUMIN/GLOB SERPL: 2.4 G/DL
ALP SERPL-CCNC: 96 U/L (ref 39–117)
ALT SERPL-CCNC: 26 U/L (ref 1–41)
AST SERPL-CCNC: 23 U/L (ref 1–40)
BASOPHILS # BLD AUTO: 0.03 10*3/MM3 (ref 0–0.2)
BASOPHILS NFR BLD AUTO: 0.4 % (ref 0–1.5)
BILIRUB SERPL-MCNC: 0.4 MG/DL (ref 0–1.2)
BUN SERPL-MCNC: 19 MG/DL (ref 6–20)
BUN/CREAT SERPL: 20.9 (ref 7–25)
CALCIUM SERPL-MCNC: 9.2 MG/DL (ref 8.6–10.5)
CHLORIDE SERPL-SCNC: 106 MMOL/L (ref 98–107)
CO2 SERPL-SCNC: 28.2 MMOL/L (ref 22–29)
CREAT SERPL-MCNC: 0.91 MG/DL (ref 0.76–1.27)
EGFRCR SERPLBLD CKD-EPI 2021: 102 ML/MIN/1.73
EOSINOPHIL # BLD AUTO: 0.33 10*3/MM3 (ref 0–0.4)
EOSINOPHIL NFR BLD AUTO: 4.4 % (ref 0.3–6.2)
ERYTHROCYTE [DISTWIDTH] IN BLOOD BY AUTOMATED COUNT: 13.3 % (ref 12.3–15.4)
GLOBULIN SER CALC-MCNC: 1.9 GM/DL
GLUCOSE SERPL-MCNC: 96 MG/DL (ref 65–99)
HCT VFR BLD AUTO: 48.6 % (ref 37.5–51)
HGB BLD-MCNC: 15.7 G/DL (ref 13–17.7)
IMM GRANULOCYTES # BLD AUTO: 0.02 10*3/MM3 (ref 0–0.05)
IMM GRANULOCYTES NFR BLD AUTO: 0.3 % (ref 0–0.5)
LYMPHOCYTES # BLD AUTO: 1.29 10*3/MM3 (ref 0.7–3.1)
LYMPHOCYTES NFR BLD AUTO: 17.2 % (ref 19.6–45.3)
MCH RBC QN AUTO: 27 PG (ref 26.6–33)
MCHC RBC AUTO-ENTMCNC: 32.3 G/DL (ref 31.5–35.7)
MCV RBC AUTO: 83.6 FL (ref 79–97)
MONOCYTES # BLD AUTO: 0.65 10*3/MM3 (ref 0.1–0.9)
MONOCYTES NFR BLD AUTO: 8.6 % (ref 5–12)
NEUTROPHILS # BLD AUTO: 5.2 10*3/MM3 (ref 1.7–7)
NEUTROPHILS NFR BLD AUTO: 69.1 % (ref 42.7–76)
NRBC BLD AUTO-RTO: 0 /100 WBC (ref 0–0.2)
PLATELET # BLD AUTO: 274 10*3/MM3 (ref 140–450)
POTASSIUM SERPL-SCNC: 4.7 MMOL/L (ref 3.5–5.2)
PROT SERPL-MCNC: 6.5 G/DL (ref 6–8.5)
RBC # BLD AUTO: 5.81 10*6/MM3 (ref 4.14–5.8)
SODIUM SERPL-SCNC: 144 MMOL/L (ref 136–145)
URATE SERPL-MCNC: 6.1 MG/DL (ref 3.4–7)
WBC # BLD AUTO: 7.52 10*3/MM3 (ref 3.4–10.8)

## 2024-06-03 RX ORDER — GABAPENTIN 300 MG/1
300 CAPSULE ORAL EVERY EVENING
Qty: 90 CAPSULE | Refills: 1 | Status: SHIPPED | OUTPATIENT
Start: 2024-06-03

## 2024-06-03 NOTE — TELEPHONE ENCOUNTER
Rx Refill Note  Requested Prescriptions     Pending Prescriptions Disp Refills    gabapentin (NEURONTIN) 300 MG capsule [Pharmacy Med Name: GABAPENTIN 300 MG CAPSULE] 30 capsule      Sig: TAKE ONE CAPSULE BY MOUTH EVERY EVENING      Last office visit with prescribing clinician: 5/22/2024   Last telemedicine visit with prescribing clinician: Visit date not found   Next office visit with prescribing clinician: Visit date not found                         Would you like a call back once the refill request has been completed: [] Yes [] No    If the office needs to give you a call back, can they leave a voicemail: [] Yes [] No    Denise Dillon MA  06/03/24, 08:52 EDT

## 2024-06-11 RX ORDER — ALLOPURINOL 100 MG/1
100 TABLET ORAL DAILY
Qty: 90 TABLET | Refills: 1 | Status: SHIPPED | OUTPATIENT
Start: 2024-06-11 | End: 2024-12-08

## 2024-08-15 RX ORDER — IRBESARTAN 150 MG/1
150 TABLET ORAL
Qty: 30 TABLET | Refills: 5 | Status: SHIPPED | OUTPATIENT
Start: 2024-08-15

## 2024-10-11 RX ORDER — HYOSCYAMINE SULFATE 0.38 MG/1
TABLET, EXTENDED RELEASE ORAL
Qty: 50 TABLET | Refills: 6 | Status: SHIPPED | OUTPATIENT
Start: 2024-10-11

## 2024-10-31 RX ORDER — ATORVASTATIN CALCIUM 10 MG/1
10 TABLET, FILM COATED ORAL DAILY
Qty: 30 TABLET | Refills: 0 | Status: SHIPPED | OUTPATIENT
Start: 2024-10-31

## 2024-12-03 RX ORDER — ATORVASTATIN CALCIUM 10 MG/1
10 TABLET, FILM COATED ORAL DAILY
Qty: 30 TABLET | Refills: 0 | Status: SHIPPED | OUTPATIENT
Start: 2024-12-03

## 2024-12-06 ENCOUNTER — CLINICAL SUPPORT (OUTPATIENT)
Dept: FAMILY MEDICINE CLINIC | Facility: CLINIC | Age: 49
End: 2024-12-06
Payer: COMMERCIAL

## 2024-12-06 DIAGNOSIS — Z23 NEED FOR SHINGLES VACCINE: Primary | ICD-10-CM

## 2024-12-06 PROCEDURE — 90750 HZV VACC RECOMBINANT IM: CPT | Performed by: INTERNAL MEDICINE

## 2024-12-06 NOTE — PROGRESS NOTES
Injection  Injection performed in left deltoid  by Neville Navarro MA. Patient tolerated the procedure well without complications.  12/06/24   Neville Navarro MA

## 2024-12-23 ENCOUNTER — OFFICE VISIT (OUTPATIENT)
Dept: FAMILY MEDICINE CLINIC | Facility: CLINIC | Age: 49
End: 2024-12-23
Payer: COMMERCIAL

## 2024-12-23 VITALS
OXYGEN SATURATION: 96 % | HEIGHT: 73 IN | HEART RATE: 102 BPM | RESPIRATION RATE: 18 BRPM | WEIGHT: 243 LBS | SYSTOLIC BLOOD PRESSURE: 124 MMHG | DIASTOLIC BLOOD PRESSURE: 82 MMHG | BODY MASS INDEX: 32.2 KG/M2

## 2024-12-23 DIAGNOSIS — Z78.9: ICD-10-CM

## 2024-12-23 DIAGNOSIS — E78.5 HYPERLIPIDEMIA, UNSPECIFIED HYPERLIPIDEMIA TYPE: ICD-10-CM

## 2024-12-23 DIAGNOSIS — G60.9 IDIOPATHIC PERIPHERAL NEUROPATHY: Primary | ICD-10-CM

## 2024-12-23 DIAGNOSIS — M10.00 IDIOPATHIC GOUT, UNSPECIFIED CHRONICITY, UNSPECIFIED SITE: ICD-10-CM

## 2024-12-23 PROCEDURE — 99214 OFFICE O/P EST MOD 30 MIN: CPT | Performed by: INTERNAL MEDICINE

## 2024-12-23 RX ORDER — ALLOPURINOL 100 MG/1
TABLET ORAL
COMMUNITY
Start: 2024-12-14

## 2024-12-23 RX ORDER — GABAPENTIN 300 MG/1
300 CAPSULE ORAL EVERY EVENING
Qty: 90 CAPSULE | Refills: 1 | Status: SHIPPED | OUTPATIENT
Start: 2024-12-23

## 2024-12-23 RX ORDER — ATORVASTATIN CALCIUM 10 MG/1
10 TABLET, FILM COATED ORAL DAILY
Qty: 90 TABLET | Refills: 1 | Status: SHIPPED | OUTPATIENT
Start: 2024-12-23

## 2024-12-23 RX ORDER — GABAPENTIN 300 MG/1
300 CAPSULE ORAL EVERY EVENING
Qty: 30 CAPSULE | OUTPATIENT
Start: 2024-12-23

## 2024-12-23 NOTE — PROGRESS NOTES
Subjective complaint is Medicaid and refill on gabapentin but also problem with dropping things  Anthony Landers is a 49 y.o. male.     Upper Extremity Issue  Pertinent negative symptoms include no weakness.  Antohny is here today for medication refill.  He is on some gabapentin for some neuropathy symptoms.  It is effective without causing any issues.  He does have gout.  He does take allopurinol.  He is not having any difficulty with gout flares.  He does use some diclofenac for some osteoarthritis.  He is complaining of some difficulty with gripping things and dropping things.  He did have an EMG nerve conduction test on his upper extremities in 2017 that showed a normal EMG and may be some peripheral neuropathy type symptoms from the nerve conductions.    The following portions of the patient's history were reviewed and updated as appropriate: allergies, current medications, and problem list.    Review of Systems   Musculoskeletal:  Positive for arthralgias.   Neurological:  Negative for weakness.     Objective   Physical Exam  Vitals and nursing note reviewed.   Constitutional:       Appearance: Normal appearance.   Neck:      Vascular: No carotid bruit.   Cardiovascular:      Rate and Rhythm: Normal rate and regular rhythm.      Pulses: Normal pulses.   Pulmonary:      Breath sounds: No wheezing, rhonchi or rales.   Musculoskeletal:      Comments: He has osteoarthritic deformity at the wrist as well as the MCP and PIP joints.  He has fairly symmetric proximal and distal muscle strength in the upper extremities.  He has fairly strong pinch strength on both sides.   Neurological:      Mental Status: He is alert.     Assessment & Plan   Diagnoses and all orders for this visit:    1. Idiopathic peripheral neuropathy (Primary)  -     CBC & Differential; Future  -     EMG & Nerve Conduction Test; Future    2. Difficulty gripping  -     EMG & Nerve Conduction Test; Future    3. Idiopathic gout, unspecified chronicity,  unspecified site  -     Comprehensive Metabolic Panel; Future  -     Uric Acid; Future    4. Hyperlipidemia, unspecified hyperlipidemia type  -     Comprehensive Metabolic Panel; Future  -     Lipid Panel; Future    Other orders  -     gabapentin (NEURONTIN) 300 MG capsule; Take 1 capsule by mouth Every Evening.  Dispense: 90 capsule; Refill: 1  -     atorvastatin (LIPITOR) 10 MG tablet; Take 1 tablet by mouth Daily.  Dispense: 90 tablet; Refill: 1    Anthony is here today for medication refill.  We are going to refill his gabapentin.  He is due to have some lab work done for his cholesterol as well as gout.  We are going to set him up for some fasting labs for that.  Going to repeat a EMG nerve conduction test.

## 2025-01-03 ENCOUNTER — PROCEDURE VISIT (OUTPATIENT)
Dept: NEUROLOGY | Facility: CLINIC | Age: 50
End: 2025-01-03
Payer: COMMERCIAL

## 2025-01-03 DIAGNOSIS — Z78.9: ICD-10-CM

## 2025-01-03 DIAGNOSIS — G60.9 IDIOPATHIC PERIPHERAL NEUROPATHY: ICD-10-CM

## 2025-01-03 NOTE — PROGRESS NOTES
"EMG and Nerve Conduction Studies    I.      Instrument used: Neuromax 1002  II.     Please see data sheets for tabular summary of NCS and details on methods, temperatures and lab standards.   III.    EMG muscles tested for upper extremity studies include the deltoid, biceps, triceps, pronator teres, extensor digitorum communis, first dorsal interosseous and abductor pollicis brevis.    IV.   EMG muscles tested for lower extremity studies include the vastus lateralis, tibialis anterior, peroneus longus, medial gastrocnemius and extensor digitorum brevis.    V.    Additional muscles tested as needed.  Paraspinal muscles tested as needed.   VI.   Please see data sheets for tabular summary of EMG findings.   VII. The complete report includes the data sheets.      Indication: Intermittent numbness in the hands  History: 49-year-old male with history of neuropathy of the feet who describes intermittent numbness in the hands and dropping things with the hands.  He describes pain over the dorsal radial aspect of the right hand in particular.  He denies a history of diabetes or thyroid disease.  He denies neck pain.  He has occasional elbow pain.      Ht: 73 inches  Wt: 243 pounds  HbA1C:   Lab Results   Component Value Date    HGBA1C 5.30 07/02/2021     TSH: No results found for: \"TSH\"    Technical summary:  Nerve conduction studies were obtained in both arms.  Skin temperatures were at least 32 °C measured on the palms.  Needle examination was obtained on selected muscles in both arms.    Results:  1.  Prolonged median antidromic sensory distal latencies bilaterally at 4.2 ms on the left and 4.4 ms on the right with normal amplitudes.  2.  Normal left ulnar antidromic sensory distal latency with low amplitude of 4.9 µV.  Prolonged right ulnar antidromic sensory distal latency at 4.1 ms with low amplitude of 4.3 µV.  3.  Normal right radial sensory distal latency and amplitude.  4.  Prolonged median motor latencies " bilaterally at 5.1 ms on the left and 5.6 ms on the right.  The conduction velocity was slow on the left at 47.9 m/s but normal on the right.  Normal amplitudes.  5.  Slow left ulnar motor conduction velocity in the short segment across the elbow at 25 m/s with slow velocity below the elbow at 47.2 m/s.  Normal distal latency and amplitudes.  Slow right ulnar motor conduction velocity in the short segment across the elbow at 27.3 m/s with a normal velocity below the elbow.  Normal distal latency with very low amplitude of 1.9 mV from wrist stimulation.  6.  Needle examination of selected muscles in both arms showed positive sharp waves in the right first dorsal interosseous with a increased number of large motor units rapid firing rate and reduced interference pattern.  The remaining muscles tested showed normal insertional activities.  There was a mild increased number of large motor units in the left first dorsal interosseous with mildly increased firing rate and reduced interference pattern.  Remaining muscles tested showed normal motor units and recruitment other than the left abductor pollicis brevis showing some increase in firing rate and reduced interference pattern.  Cervical paraspinals at C7 showed no abnormality on either side.    Impression:  Abnormal study showing moderate bilateral median neuropathies at the wrists with bilateral ulnar neuropathies at the elbows moderate on the left and severe on the right.  This combination is consistent with a more diffuse peripheral neuropathy.  Needle exam changes were consistent with nerve conduction findings.  Study results were discussed with the patient.    To Rawls M.D.              Dictated utilizing Dragon dictation.

## 2025-02-03 ENCOUNTER — TELEPHONE (OUTPATIENT)
Dept: FAMILY MEDICINE CLINIC | Facility: CLINIC | Age: 50
End: 2025-02-03
Payer: COMMERCIAL

## 2025-02-03 NOTE — TELEPHONE ENCOUNTER
Pt was calling in regards of wanting to go over EKG results can you please assist with this,.    I attempted to call the patient.  There was no answer.  I did leave a message that I really do not have much to offer in terms of the neuropathy.  He might need to see the neurologist that performed the test.

## 2025-02-06 RX ORDER — IRBESARTAN 150 MG/1
150 TABLET ORAL
Qty: 30 TABLET | Refills: 5 | Status: SHIPPED | OUTPATIENT
Start: 2025-02-06

## 2025-02-06 RX ORDER — ALLOPURINOL 100 MG/1
100 TABLET ORAL DAILY
Qty: 30 TABLET | Refills: 5 | Status: SHIPPED | OUTPATIENT
Start: 2025-02-06

## 2025-02-14 ENCOUNTER — TELEPHONE (OUTPATIENT)
Dept: FAMILY MEDICINE CLINIC | Facility: CLINIC | Age: 50
End: 2025-02-14
Payer: COMMERCIAL

## 2025-02-14 DIAGNOSIS — G56.12 MEDIAN NEUROPATHY, LEFT: ICD-10-CM

## 2025-02-14 DIAGNOSIS — G56.23 ULNAR NEUROPATHY OF BOTH UPPER EXTREMITIES: Primary | ICD-10-CM

## 2025-02-14 DIAGNOSIS — G56.11 MEDIAN NEUROPATHY, RIGHT: ICD-10-CM

## 2025-02-14 NOTE — TELEPHONE ENCOUNTER
Hub staff attempted to follow warm transfer process and was unsuccessful     Caller: Anthony Landers    Relationship to patient: Self    Best call back number: [phone] BEFORE 1:30    Patient is needing: PATIENT RETURNED CALL FROM 2/13/25, PLEASE CALL HIM BACK     EMG results were discussed with the patient.  I think this is actually going to be more bilateral compressive neuropathies due to his type of employment.  I am going to refer him to a hand surgeon.

## 2025-03-06 RX ORDER — OMEPRAZOLE 40 MG/1
40 CAPSULE, DELAYED RELEASE ORAL DAILY
Qty: 30 CAPSULE | OUTPATIENT
Start: 2025-03-06

## 2025-03-12 ENCOUNTER — TELEPHONE (OUTPATIENT)
Dept: GASTROENTEROLOGY | Facility: CLINIC | Age: 50
End: 2025-03-12
Payer: COMMERCIAL

## 2025-03-12 RX ORDER — OMEPRAZOLE 40 MG/1
40 CAPSULE, DELAYED RELEASE ORAL DAILY
Qty: 90 CAPSULE | Refills: 0 | Status: SHIPPED | OUTPATIENT
Start: 2025-03-12

## 2025-03-12 NOTE — TELEPHONE ENCOUNTER
Patient called. Scheduled f/u visit for 5/30@1015 with Virginia. Omeprazole refill e-scribed to patient's pharmacy.

## 2025-03-12 NOTE — TELEPHONE ENCOUNTER
Caller: Anthony Landers    Relationship: Self    Best call back number: [phone]    Requested Prescriptions: OMEPRAZOLE   Requested Prescriptions      No prescriptions requested or ordered in this encounter        Pharmacy where request should be sent:  [facility] [id number] - [place], [place] - [address]  & ([facility]) - [phone]  - [phone] FX     Last office visit with prescribing clinician: Visit date not found   Last telemedicine visit with prescribing clinician: Visit date not found   Next office visit with prescribing clinician: Visit date not found     Additional details provided by patient: PT STATES HIS REFILL REQUEST WAS DENIED. PLEASE CONTACT PT W/ANY QUESTIONS OR CONCERNS. BEST TIME TO REACH PT IS BETWEEN 11AM AND 1:30PM DAILY    Does the patient have less than a 3 day supply:  [x] Yes  [] No    Would you like a call back once the refill request has been completed: [x] Yes [] No    If the office needs to give you a call back, can they leave a voicemail: [x] Yes [] No    Laron Martini Rep   03/12/25 11:53 EDT

## 2025-03-30 NOTE — OUTREACH NOTE
NAME:  Sarah Parikh   :   1964   MRN:   [id number]     Date/Time:  3/30/2025 7:58 AM  Subjective: f/u caregiver stress,fatigue.F/U Vit D deficiency,chol   HPI   Follow-up  The history is provided by the Patient. This is a chronic problem. The problem occurs daily. The problem has not changed since onset.Pertinent negatives include no chest pain, no abdominal pain, no headaches and no shortness of breath.   Abnormal Lab Results  The history is provided by the Patient. This is a chronic problem. The problem occurs daily. Pertinent negatives include no chest pain, no abdominal pain, no headaches and no shortness of breath.   Fatigue  The history is provided by the Patient. This is a chronic problem. The problem occurs daily. Pertinent negatives include no chest pain, no abdominal pain, no headaches and no shortness of breath.     Review of Systems   Constitutional:  Positive for fatigue.   HENT:  Negative for congestion.    Respiratory:  Negative for chest tightness and shortness of breath.    Cardiovascular:  Negative for chest pain and palpitations.   Gastrointestinal:  Negative for abdominal distention, abdominal pain and anal bleeding.   Musculoskeletal:  Positive for back pain and myalgias. Negative for arthralgias.   Psychiatric/Behavioral:  The patient is nervous/anxious.              Medications reviewed:  No current outpatient medications on file.     No current facility-administered medications for this visit.        Objective:   Vitals:  BP (!) 143/90 (BP Site: Left Upper Arm, Patient Position: Sitting, BP Cuff Size: Large Adult)   Pulse 67   Temp 97.7 °F (36.5 °C) (Infrared)   Resp 18   Ht 1.6 m (5' 3\")   Wt 79 kg (174 lb 3.2 oz)   SpO2 99%   BMI 30.86 kg/m²         PHYSICAL EXAM:  General:     Alert, cooperative, no distress, appears stated age.     Head:    Normocephalic, without obvious abnormality, atraumatic.  Ears:   External canals WNL TMs WNL   Eyes:    Conjunctivae/corneas  Call Center TCM Note    Flowsheet Row Responses   [facility] facility patient discharged from? [facility]   Does the patient have one of the following disease processes/diagnoses(primary or secondary)? Total Joint Replacement   Joint surgery performed? Knee   TCM attempt successful? No   Unsuccessful attempts Attempt 1          Belinda Hobbs MA    9/20/2022, 13:18 EDT

## 2025-04-14 RX ORDER — HYOSCYAMINE SULFATE 0.38 MG/1
TABLET, EXTENDED RELEASE ORAL
Qty: 50 TABLET | Refills: 6 | Status: SHIPPED | OUTPATIENT
Start: 2025-04-14

## 2025-05-30 ENCOUNTER — OFFICE VISIT (OUTPATIENT)
Dept: GASTROENTEROLOGY | Facility: CLINIC | Age: 50
End: 2025-05-30
Payer: COMMERCIAL

## 2025-05-30 ENCOUNTER — TELEPHONE (OUTPATIENT)
Dept: GASTROENTEROLOGY | Facility: CLINIC | Age: 50
End: 2025-05-30
Payer: COMMERCIAL

## 2025-05-30 VITALS
BODY MASS INDEX: 31.61 KG/M2 | TEMPERATURE: 96.9 F | HEART RATE: 80 BPM | WEIGHT: 238.5 LBS | SYSTOLIC BLOOD PRESSURE: 124 MMHG | HEIGHT: 73 IN | DIASTOLIC BLOOD PRESSURE: 84 MMHG

## 2025-05-30 DIAGNOSIS — Z12.11 SCREENING FOR COLORECTAL CANCER: ICD-10-CM

## 2025-05-30 DIAGNOSIS — K58.0 IRRITABLE BOWEL SYNDROME WITH DIARRHEA: ICD-10-CM

## 2025-05-30 DIAGNOSIS — K21.9 GASTROESOPHAGEAL REFLUX DISEASE WITHOUT ESOPHAGITIS: Primary | ICD-10-CM

## 2025-05-30 DIAGNOSIS — Z12.12 SCREENING FOR COLORECTAL CANCER: ICD-10-CM

## 2025-05-30 RX ORDER — OMEPRAZOLE 40 MG/1
40 CAPSULE, DELAYED RELEASE ORAL DAILY
Qty: 90 CAPSULE | Refills: 3 | Status: SHIPPED | OUTPATIENT
Start: 2025-05-30

## 2025-05-30 NOTE — TELEPHONE ENCOUNTER
MARISELA SALCIDO  for COLONOSCOPY on  08/15 arrive at  11AM  . mailed prep instructions to verified address on file....miralax OK FOR THE HUB TO RELAY

## 2025-05-30 NOTE — PROGRESS NOTES
"Chief Complaint  Irritable Bowel Syndrome and Heartburn    Subjective          History Of Present Illness:    Anthony Landers is a  50 y.o. male of Dr. Robles who presents as a follow-up for GERD and IBS    Patient is doing generally well.  Continues to take omeprazole for GERD.  He denies any issues with dysphagia, odynophagia, nausea, vomiting, dyspepsia.  He continues to take hyoscyamine twice daily for IBS.  No significant issues with diarrhea, constipation, melena, hematochezia.  No abnormal weight loss or poor appetite.    Additional data reviewed:  Colonoscopy May 2014 normal recall 10 years.  EGD 2014 normal    Objective   Vital Signs:   /84   Pulse 80   Temp 96.9 °F (36.1 °C)   Ht 185.4 cm (73\")   Wt 108 kg (238 lb 8 oz)   BMI 31.47 kg/m²       Physical Exam  Vitals reviewed.   Constitutional:       General: He is not in acute distress.     Appearance: Normal appearance. He is not ill-appearing.   HENT:      Head: Normocephalic and atraumatic.      Nose: Nose normal.      Mouth/Throat:      Pharynx: Oropharynx is clear.   Eyes:      Conjunctiva/sclera: Conjunctivae normal.   Pulmonary:      Effort: Pulmonary effort is normal.   Abdominal:      General: There is no distension.      Palpations: Abdomen is soft. There is no mass.      Tenderness: There is no abdominal tenderness.   Musculoskeletal:         General: No swelling. Normal range of motion.      Cervical back: Normal range of motion.   Skin:     General: Skin is warm and dry.      Findings: No bruising or rash.   Neurological:      General: No focal deficit present.      Mental Status: He is alert and oriented to person, place, and time.      Motor: No weakness.      Gait: Gait normal.   Psychiatric:         Mood and Affect: Mood normal.          Result Review :   The following data was reviewed by: Virginia Weaver PA-C on 05/30/2025:  CMP          2/12/2025    11:16   CMP   Glucose 95    BUN 25    Creatinine 0.92    EGFR 100.7  "   Sodium 141    Potassium 4.4    Chloride 104    Calcium 9.3    Total Protein 6.6    Albumin 4.2    Globulin 2.4    Total Bilirubin <0.2    Alkaline Phosphatase 101    AST (SGOT) 24    ALT (SGPT) 29    Albumin/Globulin Ratio 1.8    BUN/Creatinine Ratio 27.2      CBC          2/12/2025    11:16   CBC   WBC 6.89    RBC 5.85    Hemoglobin 16.0    Hematocrit 48.3    MCV 82.6    MCH 27.4    MCHC 33.1    RDW 13.4    Platelets 262            Assessment and Plan    Diagnoses and all orders for this visit:    1. Gastroesophageal reflux disease without esophagitis (Primary)  -     omeprazole (priLOSEC) 40 MG capsule; Take 1 capsule by mouth Daily.  Dispense: 90 capsule; Refill: 3    2. Irritable bowel syndrome with diarrhea    3. Screening for colorectal cancer  -     Case Request; Standing  -     Implement Anesthesia orders day of procedure.; Standing  -     Follow Anesthesia Guidelines / Protocol; Future  -     Verify bowel prep was successful; Standing  -     Give tap water enema if bowel prep was insufficient; Standing  -     Obtain Informed Consent; Standing  -     Case Request       Continue omeprazole 40 mg daily  Continue hyoscyamine twice daily for IBS  Proceed with screening colonoscopy with Dr. Robles.    Follow Up   Return for Colonoscopy.    Dragon dictation used throughout this note.            Virginia Cardenas PA-C   [facility]  [address] [place] [address]  Office: [phone]

## 2025-06-30 RX ORDER — ATORVASTATIN CALCIUM 10 MG/1
10 TABLET, FILM COATED ORAL DAILY
Qty: 30 TABLET | Refills: 0 | Status: SHIPPED | OUTPATIENT
Start: 2025-06-30

## 2025-06-30 RX ORDER — GABAPENTIN 300 MG/1
300 CAPSULE ORAL EVERY EVENING
Qty: 30 CAPSULE | Refills: 0 | Status: SHIPPED | OUTPATIENT
Start: 2025-06-30

## 2025-06-30 RX ORDER — GABAPENTIN 300 MG/1
300 CAPSULE ORAL EVERY EVENING
Qty: 30 CAPSULE | OUTPATIENT
Start: 2025-06-30

## 2025-07-03 ENCOUNTER — TRANSCRIBE ORDERS (OUTPATIENT)
Dept: ADMINISTRATIVE | Facility: HOSPITAL | Age: 50
End: 2025-07-03
Payer: COMMERCIAL

## 2025-07-03 ENCOUNTER — HOSPITAL ENCOUNTER (OUTPATIENT)
Dept: CARDIOLOGY | Facility: HOSPITAL | Age: 50
Discharge: HOME | End: 2025-07-03
Payer: COMMERCIAL

## 2025-07-03 ENCOUNTER — LAB (OUTPATIENT)
Dept: LAB | Facility: HOSPITAL | Age: 50
End: 2025-07-03
Payer: COMMERCIAL

## 2025-07-03 DIAGNOSIS — G56.23 ULNAR NEUROPATHY OF BOTH UPPER EXTREMITIES: ICD-10-CM

## 2025-07-03 DIAGNOSIS — Z01.818 PRE-OP TESTING: ICD-10-CM

## 2025-07-03 DIAGNOSIS — Z01.818 PRE-OP TESTING: Primary | ICD-10-CM

## 2025-07-03 DIAGNOSIS — G56.03 CARPAL TUNNEL SYNDROME, BILATERAL: Primary | ICD-10-CM

## 2025-07-03 DIAGNOSIS — G56.03 CARPAL TUNNEL SYNDROME, BILATERAL: ICD-10-CM

## 2025-07-03 LAB
ANION GAP SERPL CALCULATED.3IONS-SCNC: 8.3 MMOL/L (ref 5–15)
BUN SERPL-MCNC: 21.8 MG/DL (ref 6–20)
BUN/CREAT SERPL: 24.2 (ref 7–25)
CALCIUM SPEC-SCNC: 9.1 MG/DL (ref 8.6–10.5)
CHLORIDE SERPL-SCNC: 104 MMOL/L (ref 98–107)
CO2 SERPL-SCNC: 29.7 MMOL/L (ref 22–29)
CREAT SERPL-MCNC: 0.9 MG/DL (ref 0.76–1.27)
EGFRCR SERPLBLD CKD-EPI 2021: 102.8 ML/MIN/1.73
GLUCOSE SERPL-MCNC: 117 MG/DL (ref 65–99)
POTASSIUM SERPL-SCNC: 4.5 MMOL/L (ref 3.5–5.2)
QT INTERVAL: 351 MS
QTC INTERVAL: 418 MS
SODIUM SERPL-SCNC: 142 MMOL/L (ref 136–145)

## 2025-07-03 PROCEDURE — 93005 ELECTROCARDIOGRAM TRACING: CPT | Performed by: PLASTIC SURGERY

## 2025-07-03 PROCEDURE — 80048 BASIC METABOLIC PNL TOTAL CA: CPT

## 2025-07-03 PROCEDURE — 36415 COLL VENOUS BLD VENIPUNCTURE: CPT

## 2025-07-07 ENCOUNTER — TELEPHONE (OUTPATIENT)
Dept: GASTROENTEROLOGY | Facility: CLINIC | Age: 50
End: 2025-07-07
Payer: COMMERCIAL

## 2025-07-07 NOTE — TELEPHONE ENCOUNTER
Called and LM for patient to contact the office to reschedule appointment due to provider BEING OUT .  Please forward returned phone call to the office.  Thank you, ok for hub to relay

## 2025-07-07 NOTE — TELEPHONE ENCOUNTER
Pt is going to call us when he is ready to r/s at thsi time due to his wrk adrianna     Case is in the depo

## 2025-07-09 ENCOUNTER — OFFICE VISIT (OUTPATIENT)
Dept: FAMILY MEDICINE CLINIC | Facility: CLINIC | Age: 50
End: 2025-07-09
Payer: COMMERCIAL

## 2025-07-09 VITALS
HEART RATE: 86 BPM | RESPIRATION RATE: 18 BRPM | WEIGHT: 241 LBS | DIASTOLIC BLOOD PRESSURE: 68 MMHG | BODY MASS INDEX: 31.94 KG/M2 | OXYGEN SATURATION: 95 % | HEIGHT: 73 IN | SYSTOLIC BLOOD PRESSURE: 118 MMHG

## 2025-07-09 DIAGNOSIS — I10 ESSENTIAL HYPERTENSION: Primary | ICD-10-CM

## 2025-07-09 DIAGNOSIS — E78.5 HYPERLIPIDEMIA, UNSPECIFIED HYPERLIPIDEMIA TYPE: ICD-10-CM

## 2025-07-09 DIAGNOSIS — Z79.899 HIGH RISK MEDICATION USE: ICD-10-CM

## 2025-07-09 DIAGNOSIS — R20.0 NUMBNESS IN FEET: ICD-10-CM

## 2025-07-09 LAB
ALBUMIN SERPL-MCNC: 4.6 G/DL (ref 3.5–5.2)
ALBUMIN/GLOB SERPL: 2.2 G/DL
ALP SERPL-CCNC: 100 U/L (ref 39–117)
ALT SERPL-CCNC: 26 U/L (ref 1–41)
AST SERPL-CCNC: 24 U/L (ref 1–40)
BASOPHILS # BLD AUTO: 0.04 10*3/MM3 (ref 0–0.2)
BASOPHILS NFR BLD AUTO: 0.3 % (ref 0–1.5)
BILIRUB SERPL-MCNC: 0.3 MG/DL (ref 0–1.2)
BUN SERPL-MCNC: 18 MG/DL (ref 6–20)
BUN/CREAT SERPL: 18.9 (ref 7–25)
CALCIUM SERPL-MCNC: 9.4 MG/DL (ref 8.6–10.5)
CHLORIDE SERPL-SCNC: 103 MMOL/L (ref 98–107)
CHOLEST SERPL-MCNC: 167 MG/DL (ref 0–200)
CO2 SERPL-SCNC: 28.8 MMOL/L (ref 22–29)
CREAT SERPL-MCNC: 0.95 MG/DL (ref 0.76–1.27)
EGFRCR SERPLBLD CKD-EPI 2021: 96.3 ML/MIN/1.73
EOSINOPHIL # BLD AUTO: 0.43 10*3/MM3 (ref 0–0.4)
EOSINOPHIL NFR BLD AUTO: 3.8 % (ref 0.3–6.2)
ERYTHROCYTE [DISTWIDTH] IN BLOOD BY AUTOMATED COUNT: 14.1 % (ref 12.3–15.4)
GLOBULIN SER CALC-MCNC: 2.1 GM/DL
GLUCOSE SERPL-MCNC: 97 MG/DL (ref 65–99)
HCT VFR BLD AUTO: 50.5 % (ref 37.5–51)
HDLC SERPL-MCNC: 34 MG/DL (ref 40–60)
HGB BLD-MCNC: 15.6 G/DL (ref 13–17.7)
IMM GRANULOCYTES # BLD AUTO: 0.04 10*3/MM3 (ref 0–0.05)
IMM GRANULOCYTES NFR BLD AUTO: 0.3 % (ref 0–0.5)
LDLC SERPL CALC-MCNC: 104 MG/DL (ref 0–100)
LYMPHOCYTES # BLD AUTO: 1.05 10*3/MM3 (ref 0.7–3.1)
LYMPHOCYTES NFR BLD AUTO: 9.2 % (ref 19.6–45.3)
MCH RBC QN AUTO: 26.1 PG (ref 26.6–33)
MCHC RBC AUTO-ENTMCNC: 30.9 G/DL (ref 31.5–35.7)
MCV RBC AUTO: 84.6 FL (ref 79–97)
MONOCYTES # BLD AUTO: 1.02 10*3/MM3 (ref 0.1–0.9)
MONOCYTES NFR BLD AUTO: 8.9 % (ref 5–12)
NEUTROPHILS # BLD AUTO: 8.87 10*3/MM3 (ref 1.7–7)
NEUTROPHILS NFR BLD AUTO: 77.5 % (ref 42.7–76)
NRBC BLD AUTO-RTO: 0 /100 WBC (ref 0–0.2)
PLATELET # BLD AUTO: 265 10*3/MM3 (ref 140–450)
POTASSIUM SERPL-SCNC: 4.8 MMOL/L (ref 3.5–5.2)
PROT SERPL-MCNC: 6.7 G/DL (ref 6–8.5)
RBC # BLD AUTO: 5.97 10*6/MM3 (ref 4.14–5.8)
SODIUM SERPL-SCNC: 142 MMOL/L (ref 136–145)
TRIGL SERPL-MCNC: 162 MG/DL (ref 0–150)
VLDLC SERPL CALC-MCNC: 29 MG/DL (ref 5–40)
WBC # BLD AUTO: 11.45 10*3/MM3 (ref 3.4–10.8)

## 2025-07-09 PROCEDURE — 99214 OFFICE O/P EST MOD 30 MIN: CPT | Performed by: INTERNAL MEDICINE

## 2025-07-09 RX ORDER — ATORVASTATIN CALCIUM 10 MG/1
10 TABLET, FILM COATED ORAL DAILY
Qty: 90 TABLET | Refills: 1 | Status: SHIPPED | OUTPATIENT
Start: 2025-07-09

## 2025-07-09 RX ORDER — IRBESARTAN 150 MG/1
150 TABLET ORAL
Qty: 90 TABLET | Refills: 1 | Status: SHIPPED | OUTPATIENT
Start: 2025-07-09

## 2025-07-09 RX ORDER — ALLOPURINOL 100 MG/1
100 TABLET ORAL DAILY
Qty: 90 TABLET | Refills: 1 | Status: SHIPPED | OUTPATIENT
Start: 2025-07-09

## 2025-07-09 RX ORDER — GABAPENTIN 300 MG/1
300 CAPSULE ORAL EVERY EVENING
Qty: 90 CAPSULE | Refills: 1 | Status: SHIPPED | OUTPATIENT
Start: 2025-07-09

## 2025-07-09 NOTE — PROGRESS NOTES
Subjective chief complaint is medication refills for gabapentin  Anthony Landers is a 50 y.o. male.     Hyperlipidemia  Pertinent negatives include no chest pain or shortness of breath.  Anthony is here today for medication refill on his gabapentin.  He takes 300 mg of gabapentin at night.  It does seem to help with the tingling and neuropathy symptoms in his feet.  He also is on some diclofenac for osteoarthritic symptoms.  He is due to have some lab work for that.  His blood pressure seems to be doing okay even with the diclofenac.  He has not had any recent gout attacks.    The following portions of the patient's history were reviewed and updated as appropriate: He  has a past medical history of Asthma, Carpal tunnel syndrome, GERD (gastroesophageal reflux disease), Gout, Hand, foot and mouth disease (09/2021), Hyperlipidemia, Hypertension, Irritable bowel syndrome, Needle phobia, Peripheral neuropathy, and Primary osteoarthritis of right knee.  He does not have any pertinent problems on file.  Current Outpatient Medications   Medication Sig Dispense Refill    allopurinol (ZYLOPRIM) 100 MG tablet Take 1 tablet by mouth Daily. 90 tablet 1    atorvastatin (LIPITOR) 10 MG tablet Take 1 tablet by mouth Daily. 90 tablet 1    diclofenac (VOLTAREN) 50 MG EC tablet Take 1 tablet by mouth 2 (Two) Times a Day. 180 tablet 1    gabapentin (NEURONTIN) 300 MG capsule Take 1 capsule by mouth Every Evening. 90 capsule 1    hyoscyamine (LEVBID) 0.375 MG 12 hr tablet TAKE ONE TABLET BY MOUTH EVERY 12 HOURS AS NEEDED FOR CRAMPING/DIARRHEA 50 tablet 6    irbesartan (AVAPRO) 150 MG tablet Take 1 tablet by mouth every night at bedtime. 90 tablet 1    omeprazole (priLOSEC) 40 MG capsule Take 1 capsule by mouth Daily. 90 capsule 3     No current facility-administered medications for this visit.     He is allergic to propranolol..    Review of Systems   Respiratory:  Negative for chest tightness and shortness of breath.    Cardiovascular:   Negative for chest pain and leg swelling.   Gastrointestinal:  Negative for abdominal pain and blood in stool.   Neurological:  Negative for dizziness, light-headedness and headaches.     Objective   Physical Exam  Vitals and nursing note reviewed.   Constitutional:       Appearance: Normal appearance.   Neck:      Vascular: No carotid bruit.   Cardiovascular:      Rate and Rhythm: Normal rate and regular rhythm.      Heart sounds: No murmur heard.     No friction rub. No gallop.   Pulmonary:      Effort: Pulmonary effort is normal.      Breath sounds: No wheezing, rhonchi or rales.   Abdominal:      General: Bowel sounds are normal.      Palpations: Abdomen is soft.      Tenderness: There is no abdominal tenderness. There is no guarding or rebound.   Musculoskeletal:      Right lower leg: No edema.      Left lower leg: No edema.   Neurological:      Mental Status: He is alert.     Assessment & Plan   Diagnoses and all orders for this visit:    1. Essential hypertension (Primary)  -     Comprehensive Metabolic Panel  -     CBC & Differential    2. Hyperlipidemia, unspecified hyperlipidemia type  -     Lipid Panel    3. Numbness in feet    4. High risk medication use  -     CBC & Differential    Other orders  -     allopurinol (ZYLOPRIM) 100 MG tablet; Take 1 tablet by mouth Daily.  Dispense: 90 tablet; Refill: 1  -     atorvastatin (LIPITOR) 10 MG tablet; Take 1 tablet by mouth Daily.  Dispense: 90 tablet; Refill: 1  -     diclofenac (VOLTAREN) 50 MG EC tablet; Take 1 tablet by mouth 2 (Two) Times a Day.  Dispense: 180 tablet; Refill: 1  -     gabapentin (NEURONTIN) 300 MG capsule; Take 1 capsule by mouth Every Evening.  Dispense: 90 capsule; Refill: 1  -     irbesartan (AVAPRO) 150 MG tablet; Take 1 tablet by mouth every night at bedtime.  Dispense: 90 tablet; Refill: 1    Anthony is here today for follow-up.  His blood pressure seems to be doing okay.  We did discuss that gabapentin is a controlled substance.  He is  required to have visits every 6 months.  We did discuss that diclofenac use long-term can have consequences for the kidney and liver as well as risk of gastrointestinal bleeding.  We did discuss that checking on his blood count and kidney and liver chemistries will be necessary approximately every 6 months.

## 2025-07-11 ENCOUNTER — TELEPHONE (OUTPATIENT)
Dept: FAMILY MEDICINE CLINIC | Facility: CLINIC | Age: 50
End: 2025-07-11
Payer: COMMERCIAL

## 2025-07-11 RX ORDER — AZITHROMYCIN 250 MG/1
TABLET, FILM COATED ORAL
Qty: 6 TABLET | Refills: 0 | Status: SHIPPED | OUTPATIENT
Start: 2025-07-11

## 2025-07-11 NOTE — TELEPHONE ENCOUNTER
The patient was returning a call about his lab work.  We had advised him to call regarding the elevated white count.  He is not having any abdominal pain or dysuria.  He is having some sinus symptoms which he thought may have just been allergies.  I am going to go ahead and treat him with an antibiotic

## 2025-07-17 ENCOUNTER — TELEPHONE (OUTPATIENT)
Dept: FAMILY MEDICINE CLINIC | Facility: CLINIC | Age: 50
End: 2025-07-17
Payer: COMMERCIAL

## 2025-07-17 NOTE — TELEPHONE ENCOUNTER
Caller: Anthony Landers    Relationship: Self    Best call back number:     [phone]       What is the best time to reach you: ANY    Who are you requesting to speak with (clinical staff, provider,  specific staff member): PCP    Do you know the name of the person who called:     What was the call regarding: PATIENT IS HAVING CARPAL TUNNEL SURGERY ON 7/24/25. PATIENT HAS TO STOP TAKING DICOFLENAC TODAY FOR TWO WEEKS.      Is it okay if the provider responds through MyChart:     Advised avoiding over-the-counter anti-inflammatories.  Advised Tylenol would be okay as well as ice and heat

## 2025-07-28 RX ORDER — ATORVASTATIN CALCIUM 10 MG/1
10 TABLET, FILM COATED ORAL DAILY
Qty: 30 TABLET | Refills: 3 | Status: SHIPPED | OUTPATIENT
Start: 2025-07-28

## (undated) DEVICE — SOL IRR NACL 0.9PCT 3000ML

## (undated) DEVICE — DUAL CUT SAGITTAL BLADE

## (undated) DEVICE — GLV SURG PREMIERPRO ORTHO LTX PF SZ7.5 BRN

## (undated) DEVICE — PK KN TOTL 40

## (undated) DEVICE — DRSNG SURESITE WNDW 2.38X2.75

## (undated) DEVICE — TBG PENCL TELESCP MEGADYNE SMOKE EVAC 10FT

## (undated) DEVICE — GLV SURG SENSICARE PI MIC PF SZ7 LF STRL

## (undated) DEVICE — TRAP FLD MINIVAC MEGADYNE 100ML

## (undated) DEVICE — PREP SOL POVIDONE/IODINE BT 4OZ

## (undated) DEVICE — MEDI-VAC YANKAUER SUCTION HANDLE W/BULBOUS TIP: Brand: CARDINAL HEALTH

## (undated) DEVICE — SYS CLS SKIN PREMIERPRO EXOFINFUSION 22CM

## (undated) DEVICE — STERILE PATIENT PROTECTIVE PAD FOR IMP® KNEE POSITIONERS & COHESIVE WRAP (10 / CASE): Brand: DE MAYO KNEE POSITIONER®

## (undated) DEVICE — MAT FLR ABSORBENT LG 4FT 10 2.5FT

## (undated) DEVICE — GLV SURG BIOGEL LTX PF 7 1/2

## (undated) DEVICE — GLV SURG SENSICARE PI PF LF 7 GRN STRL

## (undated) DEVICE — ANTIBACTERIAL UNDYED BRAIDED (POLYGLACTIN 910), SYNTHETIC ABSORBABLE SUTURE: Brand: COATED VICRYL

## (undated) DEVICE — 3M™ IOBAN™ 2 ANTIMICROBIAL INCISE DRAPE 6640EZ: Brand: IOBAN™ 2

## (undated) DEVICE — SUT VIC 1 CT1 36IN J947H

## (undated) DEVICE — PREMIUM WET SKIN PREP TRAY: Brand: MEDLINE INDUSTRIES, INC.

## (undated) DEVICE — APPL DURAPREP IODOPHOR APL 26ML

## (undated) DEVICE — BNDG ELAS ELITE V/CLOSE 6IN 5YD LF STRL

## (undated) DEVICE — THE STERILE LIGHT HANDLE COVER IS USED WITH STERIS SURGICAL LIGHTING AND VISUALIZATION SYSTEMS.

## (undated) DEVICE — NEEDLE, QUINCKE 22GX3.5": Brand: MEDLINE INDUSTRIES, INC.

## (undated) DEVICE — GLV SURG SENSICARE W/ALOE PF LF 8 STRL

## (undated) DEVICE — KT DRN EVAC WND PVC PCH WTROC RND 10F400